# Patient Record
Sex: FEMALE | Race: BLACK OR AFRICAN AMERICAN | NOT HISPANIC OR LATINO | URBAN - METROPOLITAN AREA
[De-identification: names, ages, dates, MRNs, and addresses within clinical notes are randomized per-mention and may not be internally consistent; named-entity substitution may affect disease eponyms.]

---

## 2024-04-15 ENCOUNTER — NURSING HOME VISIT (OUTPATIENT)
Dept: POST ACUTE CARE | Facility: EXTERNAL LOCATION | Age: 89
End: 2024-04-15
Payer: MEDICARE

## 2024-04-15 DIAGNOSIS — I10 HYPERTENSION, UNSPECIFIED TYPE: ICD-10-CM

## 2024-04-15 DIAGNOSIS — I50.9 CONGESTIVE HEART FAILURE, UNSPECIFIED HF CHRONICITY, UNSPECIFIED HEART FAILURE TYPE (MULTI): ICD-10-CM

## 2024-04-15 DIAGNOSIS — A49.9 BACTERIAL INFECTION: ICD-10-CM

## 2024-04-15 DIAGNOSIS — K21.9 GASTROESOPHAGEAL REFLUX DISEASE, UNSPECIFIED WHETHER ESOPHAGITIS PRESENT: ICD-10-CM

## 2024-04-15 DIAGNOSIS — E87.6 HYPOKALEMIA: ICD-10-CM

## 2024-04-15 DIAGNOSIS — J93.9 PNEUMOTHORAX ON RIGHT: Primary | ICD-10-CM

## 2024-04-15 PROBLEM — Z86.711 HISTORY OF PULMONARY EMBOLUS (PE): Status: ACTIVE | Noted: 2024-04-15

## 2024-04-15 PROBLEM — R53.1 WEAKNESS: Status: ACTIVE | Noted: 2024-04-15

## 2024-04-15 PROCEDURE — 99305 1ST NF CARE MODERATE MDM 35: CPT | Performed by: INTERNAL MEDICINE

## 2024-04-15 NOTE — ASSESSMENT & PLAN NOTE
Recurrent  Status post chest tube  Monitor output monitor site  Follow-up with pulmonary outpatient

## 2024-04-15 NOTE — PROGRESS NOTES
HISTORY & PHYSICAL    Subjective   Chief complaint: Suleman Marquis is a 88 y.o. female who is being seen and evaluated for multiple medical problems.  Patient admitted to SNF for therapy due to weakness after recent hospitalization.    HPI:  HPI  Patient has a past medical history that is significant for carcinoma of the breast, recurrent right pneumothorax who had presented to hospital with increasing shortness of breath.  Patient was found to have recurrent right pneumothorax and initially was treated with frequent pigtail catheter insertion and subsequently required right chest tube.  Patient was admitted for further evaluation.  Patient also has a history of congestive heart failure, echo in February 2024 revealed EF of 68%.  Patient is on O2.  PT OT worked with patient with recommendations for SNF for further therapy.  Patient was seen and examined at bedside, appears to be in no acute distress.  Denies chest pain or shortness of breath.  Denies nausea or vomiting.  Afebrile    Past Medical History:   Diagnosis Date    Anemia     Breast cancer (Multi)     CHF (congestive heart failure) (Multi)     Encounter for screening mammogram for malignant neoplasm of breast     Visit for screening mammogram    GERD (gastroesophageal reflux disease)     History of pulmonary embolus (PE)     Hypertension     Pneumothorax on right        Past Surgical History:   Procedure Laterality Date    COLONOSCOPY  03/14/2013    Complete Colonoscopy    MASTECTOMY  03/14/2013    Breast Surgery Mastectomy    OTHER SURGICAL HISTORY  03/14/2013    Sigmoidoscopy (Fiberoptic, Therapeutic )       Family History   Problem Relation Name Age of Onset    No Known Problems Mother      No Known Problems Father         Social History     Socioeconomic History    Marital status:      Spouse name: Not on file    Number of children: Not on file    Years of education: Not on file    Highest education level: Not on file   Occupational History     Not on file   Tobacco Use    Smoking status: Not on file    Smokeless tobacco: Not on file   Substance and Sexual Activity    Alcohol use: Not on file    Drug use: Not on file    Sexual activity: Not on file   Other Topics Concern    Not on file   Social History Narrative    Not on file     Social Determinants of Health     Financial Resource Strain: Low Risk  (7/31/2023)    Received from [facility]    Overall Financial Resource Strain (CARDIA)     Difficulty of Paying Living Expenses: Not hard at all   Food Insecurity: No Food Insecurity (7/31/2023)    Received from [facility]    Hunger Vital Sign     Worried About Running Out of Food in the Last Year: Never true     Ran Out of Food in the Last Year: Never true   Transportation Needs: No Transportation Needs (7/31/2023)    Received from [facility]    PRAPARE - Transportation     Lack of Transportation (Medical): No     Lack of Transportation (Non-Medical): No   Physical Activity: Not on file   Stress: Not on file   Social Connections: Not on file   Intimate Partner Violence: Not on file   Housing Stability: Low Risk  (7/31/2023)    Received from [facility]    Housing Stability Vital Sign     Unable to Pay for Housing in the Last Year: No     Number of Places Lived in the Last Year: 1     Unstable Housing in the Last Year: No       Vital signs: 107/62, 98.2, 84, 18, 99%    Objective   Physical Exam  Constitutional:       General: She is not in acute distress.  Eyes:      Extraocular Movements: Extraocular movements intact.   Cardiovascular:      Rate and Rhythm: Normal rate and regular rhythm.   Pulmonary:      Effort: Pulmonary effort is normal.      Breath sounds: Normal breath sounds.      Comments: O2  Chest:      Comments: Right chest tube  Abdominal:      General: Bowel sounds are normal.      Palpations: Abdomen is soft.   Musculoskeletal:      Cervical back: Neck supple.      Right lower leg: No edema.      Left lower leg: No edema.    Neurological:      Mental Status: She is alert.   Psychiatric:         Mood and Affect: Mood normal.         Behavior: Behavior is cooperative.         Assessment/Plan   Problem List Items Addressed This Visit       Hypertension     Monitor BP  Metoprolol         GERD (gastroesophageal reflux disease)     PPI  Monitor for GI symptoms         CHF (congestive heart failure) (Multi)     Echo revealed EF of 68% February 2024  Lasix  Monitor for shortness of breath         Hypokalemia     Potassium  Monitor labs         Pneumothorax on right - Primary     Recurrent  Status post chest tube  Monitor output monitor site  Follow-up with pulmonary outpatient         Bacterial infection     Doxycycline until complete          Hospital records reviewed  Medications, treatments, and labs reviewed  Continue medications and treatments as listed in EMR  Discussed with nursing and therapy      Scribe Attestation  I, Jeanie Bakeribe   attest that this documentation has been prepared under the direction and in the presence of Jacqueline Singh MD    Provider Attestation - Scribe documentation  All medical record entries made by the Scribe were at my direction and personally dictated by me. I have reviewed the chart and agree that the record accurately reflects my personal performance of the history, physical exam, discussion and plan.   Jacqueline Singh MD

## 2024-04-15 NOTE — LETTER
Patient: Suleman Marquis  : 1935    Encounter Date: 04/15/2024    HISTORY & PHYSICAL    Subjective  Chief complaint: Suleman Marquis is a 88 y.o. female who is being seen and evaluated for multiple medical problems.  Patient admitted to SNF for therapy due to weakness after recent hospitalization.    HPI:  HPI  Patient has a past medical history that is significant for carcinoma of the breast, recurrent right pneumothorax who had presented to hospital with increasing shortness of breath.  Patient was found to have recurrent right pneumothorax and initially was treated with frequent pigtail catheter insertion and subsequently required right chest tube.  Patient was admitted for further evaluation.  Patient also has a history of congestive heart failure, echo in 2024 revealed EF of 68%.  Patient is on O2.  PT OT worked with patient with recommendations for SNF for further therapy.  Patient was seen and examined at bedside, appears to be in no acute distress.  Denies chest pain or shortness of breath.  Denies nausea or vomiting.  Afebrile    Past Medical History:   Diagnosis Date   • Anemia    • Breast cancer (Multi)    • CHF (congestive heart failure) (Multi)    • Encounter for screening mammogram for malignant neoplasm of breast     Visit for screening mammogram   • GERD (gastroesophageal reflux disease)    • History of pulmonary embolus (PE)    • Hypertension    • Pneumothorax on right        Past Surgical History:   Procedure Laterality Date   • COLONOSCOPY  2013    Complete Colonoscopy   • MASTECTOMY  2013    Breast Surgery Mastectomy   • OTHER SURGICAL HISTORY  2013    Sigmoidoscopy (Fiberoptic, Therapeutic )       Family History   Problem Relation Name Age of Onset   • No Known Problems Mother     • No Known Problems Father         Social History     Socioeconomic History   • Marital status:      Spouse name: Not on file   • Number of children: Not on file   • Years of  education: Not on file   • Highest education level: Not on file   Occupational History   • Not on file   Tobacco Use   • Smoking status: Not on file   • Smokeless tobacco: Not on file   Substance and Sexual Activity   • Alcohol use: Not on file   • Drug use: Not on file   • Sexual activity: Not on file   Other Topics Concern   • Not on file   Social History Narrative   • Not on file     Social Determinants of Health     Financial Resource Strain: Low Risk  (7/31/2023)    Received from [facility]    Overall Financial Resource Strain (CARDIA)    • Difficulty of Paying Living Expenses: Not hard at all   Food Insecurity: No Food Insecurity (7/31/2023)    Received from [facility]    Hunger Vital Sign    • Worried About Running Out of Food in the Last Year: Never true    • Ran Out of Food in the Last Year: Never true   Transportation Needs: No Transportation Needs (7/31/2023)    Received from [facility]    PRAPARE - Transportation    • Lack of Transportation (Medical): No    • Lack of Transportation (Non-Medical): No   Physical Activity: Not on file   Stress: Not on file   Social Connections: Not on file   Intimate Partner Violence: Not on file   Housing Stability: Low Risk  (7/31/2023)    Received from [facility]    Housing Stability Vital Sign    • Unable to Pay for Housing in the Last Year: No    • Number of Places Lived in the Last Year: 1    • Unstable Housing in the Last Year: No       Vital signs: 107/62, 98.2, 84, 18, 99%    Objective  Physical Exam  Constitutional:       General: She is not in acute distress.  Eyes:      Extraocular Movements: Extraocular movements intact.   Cardiovascular:      Rate and Rhythm: Normal rate and regular rhythm.   Pulmonary:      Effort: Pulmonary effort is normal.      Breath sounds: Normal breath sounds.      Comments: O2  Chest:      Comments: Right chest tube  Abdominal:      General: Bowel sounds are normal.      Palpations: Abdomen is soft.    Musculoskeletal:      Cervical back: Neck supple.      Right lower leg: No edema.      Left lower leg: No edema.   Neurological:      Mental Status: She is alert.   Psychiatric:         Mood and Affect: Mood normal.         Behavior: Behavior is cooperative.         Assessment/Plan  Problem List Items Addressed This Visit       Hypertension     Monitor BP  Metoprolol         GERD (gastroesophageal reflux disease)     PPI  Monitor for GI symptoms         CHF (congestive heart failure) (Multi)     Echo revealed EF of 68% February 2024  Lasix  Monitor for shortness of breath         Hypokalemia     Potassium  Monitor labs         Pneumothorax on right - Primary     Recurrent  Status post chest tube  Monitor output monitor site  Follow-up with pulmonary outpatient         Bacterial infection     Doxycycline until complete          Hospital records reviewed  Medications, treatments, and labs reviewed  Continue medications and treatments as listed in EMR  Discussed with nursing and therapy      Scribe Attestation  Debbie GOMEZ Scribe   attest that this documentation has been prepared under the direction and in the presence of Jacqueline Singh MD    Provider Attestation - Scribe documentation  All medical record entries made by the Scribe were at my direction and personally dictated by me. I have reviewed the chart and agree that the record accurately reflects my personal performance of the history, physical exam, discussion and plan.   Jacqueline Singh MD      Electronically Signed By: Jacqueline Singh MD   4/15/24  3:39 PM

## 2024-04-18 ENCOUNTER — NURSING HOME VISIT (OUTPATIENT)
Dept: POST ACUTE CARE | Facility: EXTERNAL LOCATION | Age: 89
End: 2024-04-18
Payer: MEDICARE

## 2024-04-18 DIAGNOSIS — I10 HYPERTENSION, UNSPECIFIED TYPE: ICD-10-CM

## 2024-04-18 DIAGNOSIS — R53.1 WEAKNESS: Primary | ICD-10-CM

## 2024-04-18 DIAGNOSIS — J93.9 PNEUMOTHORAX ON RIGHT: ICD-10-CM

## 2024-04-18 DIAGNOSIS — E43 SEVERE PROTEIN-CALORIE MALNUTRITION (MULTI): ICD-10-CM

## 2024-04-18 DIAGNOSIS — R23.9 ALTERATION IN SKIN INTEGRITY: ICD-10-CM

## 2024-04-18 PROCEDURE — 99309 SBSQ NF CARE MODERATE MDM 30: CPT | Performed by: NURSE PRACTITIONER

## 2024-04-18 NOTE — LETTER
Patient: Suleman Marquis  : 1935    Encounter Date: 2024    PROGRESS NOTE    Subjective  Chief complaint: uSleman Marquis is a 88 y.o. female who is an acute skilled patient being seen and evaluated for weakness    HPI: Has been readmitted, had required ED visit to replace chest tube. It was determined in the hospital that the chest tube was not required to be replaced.   She is frail. Pleasant and talkative. He chest wound is covered with clean dressing. Has orders to apply metronidazole gel to the wound bed.   Remains on oral antibiotic. The therapist is scheduled to evaluate her function for treatment plan. .     HPI      Objective  Vital signs: 112/68-77-18-97.4     Physical Exam  Vitals and nursing note reviewed.   Constitutional:       General: She is not in acute distress.     Appearance: She is well-groomed. She is cachectic.   Eyes:      Extraocular Movements: Extraocular movements intact.   Cardiovascular:      Rate and Rhythm: Normal rate and regular rhythm.   Pulmonary:      Effort: Pulmonary effort is normal.      Breath sounds: Normal breath sounds.      Comments: Lungs clear   Chest:      Comments: 1. Has large open wound that involves both breast and sternal region   Wound is pink   No inflammation   No odor   2.  shaye cath  site right upper chest         Abdominal:      General: Bowel sounds are normal.      Palpations: Abdomen is soft.   Musculoskeletal:      Cervical back: Neck supple.      Right lower leg: No edema.      Left lower leg: No edema.   Neurological:      Mental Status: She is alert.   Psychiatric:         Mood and Affect: Mood normal.         Behavior: Behavior is cooperative.         Assessment/Plan  Problem List Items Addressed This Visit       Hypertension     Monitor BP  Metoprolol  Monitor labs          Weakness - Primary     Requires rehabilitation  Monitor tolerance and endurance   Is frail   Requires extensive assistance from staff for all HOC and mobility           Pneumothorax on right     Chest tube had displaced    Sent to hospital to determine if required replacement   The chest tube  was not replaced.   Monitor          Alteration in skin integrity     Has large open wound on chest wall  Wound bed is pink   TX to apply metronidazole gel to wound    Wound care provider to follow in the facility          Malnutrition (Multi)     Malnutrition HX advanced cancer  Monitor intake   Dietitian to evaluate    Supplements   Labs             Medications, treatments, and labs reviewed  Continue medications and treatments as listed in EMR    DAVID Pressley      Electronically Signed By: DAVID Pressley   4/20/24  6:02 PM

## 2024-04-20 PROBLEM — E46 MALNUTRITION (MULTI): Status: ACTIVE | Noted: 2024-04-20

## 2024-04-20 PROBLEM — R23.9 ALTERATION IN SKIN INTEGRITY: Status: ACTIVE | Noted: 2024-04-20

## 2024-04-20 NOTE — ASSESSMENT & PLAN NOTE
Chest tube had displaced    Sent to hospital to determine if required replacement   The chest tube  was not replaced.   Monitor

## 2024-04-20 NOTE — PROGRESS NOTES
PROGRESS NOTE    Subjective   Chief complaint: Suleman Marquis is a 88 y.o. female who is an acute skilled patient being seen and evaluated for weakness    HPI: Has been readmitted, had required ED visit to replace chest tube. It was determined in the hospital that the chest tube was not required to be replaced.   She is frail. Pleasant and talkative. He chest wound is covered with clean dressing. Has orders to apply metronidazole gel to the wound bed.   Remains on oral antibiotic. The therapist is scheduled to evaluate her function for treatment plan. .     HPI      Objective   Vital signs: 112/68-77-18-97.4     Physical Exam  Vitals and nursing note reviewed.   Constitutional:       General: She is not in acute distress.     Appearance: She is well-groomed. She is cachectic.   Eyes:      Extraocular Movements: Extraocular movements intact.   Cardiovascular:      Rate and Rhythm: Normal rate and regular rhythm.   Pulmonary:      Effort: Pulmonary effort is normal.      Breath sounds: Normal breath sounds.      Comments: Lungs clear   Chest:      Comments: 1. Has large open wound that involves both breast and sternal region   Wound is pink   No inflammation   No odor   2.  shaye cath  site right upper chest         Abdominal:      General: Bowel sounds are normal.      Palpations: Abdomen is soft.   Musculoskeletal:      Cervical back: Neck supple.      Right lower leg: No edema.      Left lower leg: No edema.   Neurological:      Mental Status: She is alert.   Psychiatric:         Mood and Affect: Mood normal.         Behavior: Behavior is cooperative.         Assessment/Plan   Problem List Items Addressed This Visit       Hypertension     Monitor BP  Metoprolol  Monitor labs          Weakness - Primary     Requires rehabilitation  Monitor tolerance and endurance   Is frail   Requires extensive assistance from staff for all HOC and mobility          Pneumothorax on right     Chest tube had displaced    Sent to  hospital to determine if required replacement   The chest tube  was not replaced.   Monitor          Alteration in skin integrity     Has large open wound on chest wall  Wound bed is pink   TX to apply metronidazole gel to wound    Wound care provider to follow in the facility          Malnutrition (Multi)     Malnutrition HX advanced cancer  Monitor intake   Dietitian to evaluate    Supplements   Labs             Medications, treatments, and labs reviewed  Continue medications and treatments as listed in EMR    Nikki Pressley, APRN-CNP

## 2024-04-20 NOTE — ASSESSMENT & PLAN NOTE
Requires rehabilitation  Monitor tolerance and endurance   Is frail   Requires extensive assistance from staff for all HOC and mobility

## 2024-04-20 NOTE — ASSESSMENT & PLAN NOTE
Has large open wound on chest wall  Wound bed is pink   TX to apply metronidazole gel to wound    Wound care provider to follow in the facility

## 2024-04-22 ENCOUNTER — NURSING HOME VISIT (OUTPATIENT)
Dept: POST ACUTE CARE | Facility: EXTERNAL LOCATION | Age: 89
End: 2024-04-22
Payer: MEDICARE

## 2024-04-22 DIAGNOSIS — C50.919 MALIGNANT NEOPLASM OF FEMALE BREAST, UNSPECIFIED ESTROGEN RECEPTOR STATUS, UNSPECIFIED LATERALITY, UNSPECIFIED SITE OF BREAST (MULTI): ICD-10-CM

## 2024-04-22 DIAGNOSIS — R23.9 ALTERATION IN SKIN INTEGRITY: ICD-10-CM

## 2024-04-22 DIAGNOSIS — K21.9 GASTROESOPHAGEAL REFLUX DISEASE, UNSPECIFIED WHETHER ESOPHAGITIS PRESENT: ICD-10-CM

## 2024-04-22 DIAGNOSIS — I10 HYPERTENSION, UNSPECIFIED TYPE: ICD-10-CM

## 2024-04-22 DIAGNOSIS — A49.9 BACTERIAL INFECTION: ICD-10-CM

## 2024-04-22 DIAGNOSIS — I50.9 CONGESTIVE HEART FAILURE, UNSPECIFIED HF CHRONICITY, UNSPECIFIED HEART FAILURE TYPE (MULTI): ICD-10-CM

## 2024-04-22 DIAGNOSIS — J93.9 PNEUMOTHORAX ON RIGHT: Primary | ICD-10-CM

## 2024-04-22 PROCEDURE — 99305 1ST NF CARE MODERATE MDM 35: CPT | Performed by: INTERNAL MEDICINE

## 2024-04-22 NOTE — ASSESSMENT & PLAN NOTE
Wound to chest wall  Treatment and interventions in place as per the EMR  Facility wound doctor to follow

## 2024-04-22 NOTE — PROGRESS NOTES
HISTORY & PHYSICAL    Subjective   Chief complaint: Suleman Marquis is a 88 y.o. female who is being seen and evaluated for multiple medical problems.  Patient admitted to SNF for therapy due to weakness after recent hospitalization.    HPI:  HPI  Patient presented to ED due to chest tube being pulled out.  Patient was admitted for further evaluation.  Patient had a recent hospitalization for recurrent right pneumothorax.  Patient had imaging, revealed chest x-ray clean.  Noted that patient did not need replacement of chest tube.  Patient was hemodynamically stable for discharge back to skilled nursing facility for continued medical management and therapy.  Patient does have dressing to chest wound that is clean, dry, intact.  Patient noted to continue antibiotic for bacterial infection, tolerating without adverse effects.  Patient was seen and examined at the bedside, appears to be in no acute distress.  Past Medical History:   Diagnosis Date    Anemia     Breast cancer (Multi)     CHF (congestive heart failure) (Multi)     Encounter for screening mammogram for malignant neoplasm of breast     Visit for screening mammogram    GERD (gastroesophageal reflux disease)     History of pulmonary embolus (PE)     Hypertension     Pneumothorax on right        Past Surgical History:   Procedure Laterality Date    COLONOSCOPY  03/14/2013    Complete Colonoscopy    MASTECTOMY  03/14/2013    Breast Surgery Mastectomy    OTHER SURGICAL HISTORY  03/14/2013    Sigmoidoscopy (Fiberoptic, Therapeutic )       Family History   Problem Relation Name Age of Onset    No Known Problems Mother      No Known Problems Father         Social History     Socioeconomic History    Marital status:      Spouse name: Not on file    Number of children: Not on file    Years of education: Not on file    Highest education level: Not on file   Occupational History    Not on file   Tobacco Use    Smoking status: Not on file    Smokeless tobacco:  Not on file   Substance and Sexual Activity    Alcohol use: Not on file    Drug use: Not on file    Sexual activity: Not on file   Other Topics Concern    Not on file   Social History Narrative    Not on file     Social Determinants of Health     Financial Resource Strain: Low Risk  (7/31/2023)    Received from [facility]    Overall Financial Resource Strain (CARDIA)     Difficulty of Paying Living Expenses: Not hard at all   Food Insecurity: No Food Insecurity (7/31/2023)    Received from [facility]    Hunger Vital Sign     Worried About Running Out of Food in the Last Year: Never true     Ran Out of Food in the Last Year: Never true   Transportation Needs: No Transportation Needs (7/31/2023)    Received from [facility]    PRAPARE - Transportation     Lack of Transportation (Medical): No     Lack of Transportation (Non-Medical): No   Physical Activity: Not on file   Stress: Not on file   Social Connections: Not on file   Intimate Partner Violence: Not on file   Housing Stability: Low Risk  (7/31/2023)    Received from [facility]    Housing Stability Vital Sign     Unable to Pay for Housing in the Last Year: No     Number of Places Lived in the Last Year: 1     Unstable Housing in the Last Year: No       Vital signs: 114/78, 98.1, 78, 17, 98%    Objective   Physical Exam  Constitutional:       General: She is not in acute distress.  Eyes:      Extraocular Movements: Extraocular movements intact.   Cardiovascular:      Rate and Rhythm: Normal rate and regular rhythm.   Pulmonary:      Effort: Pulmonary effort is normal.      Breath sounds: Normal breath sounds.   Abdominal:      General: Bowel sounds are normal.      Palpations: Abdomen is soft.   Musculoskeletal:      Cervical back: Neck supple.      Right lower leg: No edema.      Left lower leg: No edema.   Skin:     Comments: Dressing to chest clean dry and intact   Neurological:      Mental Status: She is alert.   Psychiatric:          Mood and Affect: Mood normal.         Behavior: Behavior is cooperative.         Assessment/Plan   Problem List Items Addressed This Visit       Hypertension     Monitor BP  Metoprolol         GERD (gastroesophageal reflux disease)     PPI  Monitor for GI symptoms         CHF (congestive heart failure) (Multi)     Echo revealed EF of 68% February 2024  Lasix  Monitor for shortness of breath         Pneumothorax on right - Primary     Chest tube pulled out, sent to ED, no further need for chest tube         Bacterial infection     Doxycycline until complete         Alteration in skin integrity     Wound to chest wall  Treatment and interventions in place as per the EMR  Facility wound doctor to follow         Breast cancer (Multi)     Follows outpatient  Anastrozole          Hospital records reviewed  Medications, treatments, and labs reviewed  Continue medications and treatments as listed in EMR  Discussed with nursing and therapy      Scribe Attestation  I, Charity Baker   attest that this documentation has been prepared under the direction and in the presence of Jacqueline Singh MD    Provider Attestation - Scribe documentation  All medical record entries made by the Scribe were at my direction and personally dictated by me. I have reviewed the chart and agree that the record accurately reflects my personal performance of the history, physical exam, discussion and plan.   Jacqueline Singh MD

## 2024-04-22 NOTE — LETTER
Patient: Suleman Marquis  : 1935    Encounter Date: 2024    HISTORY & PHYSICAL    Subjective  Chief complaint: Suleman Marquis is a 88 y.o. female who is being seen and evaluated for multiple medical problems.  Patient admitted to SNF for therapy due to weakness after recent hospitalization.    HPI:  HPI  Patient presented to ED due to chest tube being pulled out.  Patient was admitted for further evaluation.  Patient had a recent hospitalization for recurrent right pneumothorax.  Patient had imaging, revealed chest x-ray clean.  Noted that patient did not need replacement of chest tube.  Patient was hemodynamically stable for discharge back to skilled nursing facility for continued medical management and therapy.  Patient does have dressing to chest wound that is clean, dry, intact.  Patient noted to continue antibiotic for bacterial infection, tolerating without adverse effects.  Patient was seen and examined at the bedside, appears to be in no acute distress.  Past Medical History:   Diagnosis Date   • Anemia    • Breast cancer (Multi)    • CHF (congestive heart failure) (Multi)    • Encounter for screening mammogram for malignant neoplasm of breast     Visit for screening mammogram   • GERD (gastroesophageal reflux disease)    • History of pulmonary embolus (PE)    • Hypertension    • Pneumothorax on right        Past Surgical History:   Procedure Laterality Date   • COLONOSCOPY  2013    Complete Colonoscopy   • MASTECTOMY  2013    Breast Surgery Mastectomy   • OTHER SURGICAL HISTORY  2013    Sigmoidoscopy (Fiberoptic, Therapeutic )       Family History   Problem Relation Name Age of Onset   • No Known Problems Mother     • No Known Problems Father         Social History     Socioeconomic History   • Marital status:      Spouse name: Not on file   • Number of children: Not on file   • Years of education: Not on file   • Highest education level: Not on file   Occupational  History   • Not on file   Tobacco Use   • Smoking status: Not on file   • Smokeless tobacco: Not on file   Substance and Sexual Activity   • Alcohol use: Not on file   • Drug use: Not on file   • Sexual activity: Not on file   Other Topics Concern   • Not on file   Social History Narrative   • Not on file     Social Determinants of Health     Financial Resource Strain: Low Risk  (7/31/2023)    Received from [facility]    Overall Financial Resource Strain (CARDIA)    • Difficulty of Paying Living Expenses: Not hard at all   Food Insecurity: No Food Insecurity (7/31/2023)    Received from [facility]    Hunger Vital Sign    • Worried About Running Out of Food in the Last Year: Never true    • Ran Out of Food in the Last Year: Never true   Transportation Needs: No Transportation Needs (7/31/2023)    Received from [facility]    PRAPARE - Transportation    • Lack of Transportation (Medical): No    • Lack of Transportation (Non-Medical): No   Physical Activity: Not on file   Stress: Not on file   Social Connections: Not on file   Intimate Partner Violence: Not on file   Housing Stability: Low Risk  (7/31/2023)    Received from [facility]    Housing Stability Vital Sign    • Unable to Pay for Housing in the Last Year: No    • Number of Places Lived in the Last Year: 1    • Unstable Housing in the Last Year: No       Vital signs: 114/78, 98.1, 78, 17, 98%    Objective  Physical Exam  Constitutional:       General: She is not in acute distress.  Eyes:      Extraocular Movements: Extraocular movements intact.   Cardiovascular:      Rate and Rhythm: Normal rate and regular rhythm.   Pulmonary:      Effort: Pulmonary effort is normal.      Breath sounds: Normal breath sounds.   Abdominal:      General: Bowel sounds are normal.      Palpations: Abdomen is soft.   Musculoskeletal:      Cervical back: Neck supple.      Right lower leg: No edema.      Left lower leg: No edema.   Skin:     Comments: Dressing  to chest clean dry and intact   Neurological:      Mental Status: She is alert.   Psychiatric:         Mood and Affect: Mood normal.         Behavior: Behavior is cooperative.         Assessment/Plan  Problem List Items Addressed This Visit       Hypertension     Monitor BP  Metoprolol         GERD (gastroesophageal reflux disease)     PPI  Monitor for GI symptoms         CHF (congestive heart failure) (Multi)     Echo revealed EF of 68% February 2024  Lasix  Monitor for shortness of breath         Pneumothorax on right - Primary     Chest tube pulled out, sent to ED, no further need for chest tube         Bacterial infection     Doxycycline until complete         Alteration in skin integrity     Wound to chest wall  Treatment and interventions in place as per the EMR  Facility wound doctor to follow         Breast cancer (Multi)     Follows outpatient  Anastrozole          Hospital records reviewed  Medications, treatments, and labs reviewed  Continue medications and treatments as listed in EMR  Discussed with nursing and therapy      Scribe Attestation  I, Charity Baker   attest that this documentation has been prepared under the direction and in the presence of Jacqueline Singh MD    Provider Attestation - Scribe documentation  All medical record entries made by the Scribe were at my direction and personally dictated by me. I have reviewed the chart and agree that the record accurately reflects my personal performance of the history, physical exam, discussion and plan.   Jacqueline Singh MD      Electronically Signed By: Jacqueline Singh MD   4/22/24  1:09 PM

## 2024-04-23 ENCOUNTER — NURSING HOME VISIT (OUTPATIENT)
Dept: POST ACUTE CARE | Facility: EXTERNAL LOCATION | Age: 89
End: 2024-04-23
Payer: MEDICARE

## 2024-04-23 DIAGNOSIS — R53.1 WEAKNESS: ICD-10-CM

## 2024-04-23 DIAGNOSIS — K21.9 GASTROESOPHAGEAL REFLUX DISEASE, UNSPECIFIED WHETHER ESOPHAGITIS PRESENT: Primary | ICD-10-CM

## 2024-04-23 DIAGNOSIS — I50.9 CONGESTIVE HEART FAILURE, UNSPECIFIED HF CHRONICITY, UNSPECIFIED HEART FAILURE TYPE (MULTI): ICD-10-CM

## 2024-04-23 DIAGNOSIS — R23.9 ALTERATION IN SKIN INTEGRITY: ICD-10-CM

## 2024-04-23 PROCEDURE — 99309 SBSQ NF CARE MODERATE MDM 30: CPT | Performed by: NURSE PRACTITIONER

## 2024-04-23 NOTE — LETTER
Patient: Suleman Marquis  : 1935    Encounter Date: 2024    PROGRESS NOTE    Subjective  Chief complaint: Suleman Marquis is a 88 y.o. female who is an acute skilled patient being seen and evaluated for weakness    HPI: Remains  in therapy, the therapist  reports that she is motivated. Denies any chest pain or tightness. Nursing reports that her appetite is poor,   HPI      Objective  Vital signs: 121/56-72- 18-98.2     Physical Exam  Vitals and nursing note reviewed.   Constitutional:       General: She is not in acute distress.     Appearance: She is well-groomed and underweight.   Eyes:      Extraocular Movements: Extraocular movements intact.   Cardiovascular:      Rate and Rhythm: Normal rate and regular rhythm.   Pulmonary:      Effort: Pulmonary effort is normal.      Breath sounds: Normal breath sounds.      Comments: Lungs clear   Abdominal:      General: Bowel sounds are normal.      Palpations: Abdomen is soft.   Musculoskeletal:      Cervical back: Neck supple.      Right lower leg: No edema.      Left lower leg: No edema.   Neurological:      Mental Status: She is alert.   Psychiatric:         Mood and Affect: Mood normal.         Behavior: Behavior is cooperative.         Assessment/Plan  Problem List Items Addressed This Visit       GERD (gastroesophageal reflux disease) - Primary     PPI  Monitor for GI symptoms         CHF (congestive heart failure) (Multi)     Echo revealed EF of 68% 2024  Lasix  Monitor for shortness of breath         Weakness     Continue working in therapy towards goals         Alteration in skin integrity     Chest wound  Wound bed is pink   TX to apply metronidazole gel to wound    Wound care provider to follow in the facility           Medications, treatments, and labs reviewed  Continue medications and treatments as listed in EMR    DAVID Pressley      Electronically Signed By: DAVID Pressley   24  8:31 PM

## 2024-04-24 ENCOUNTER — NURSING HOME VISIT (OUTPATIENT)
Dept: POST ACUTE CARE | Facility: EXTERNAL LOCATION | Age: 89
End: 2024-04-24
Payer: MEDICARE

## 2024-04-24 DIAGNOSIS — R53.1 WEAKNESS: Primary | ICD-10-CM

## 2024-04-24 DIAGNOSIS — I10 HYPERTENSION, UNSPECIFIED TYPE: ICD-10-CM

## 2024-04-24 DIAGNOSIS — K21.9 GASTROESOPHAGEAL REFLUX DISEASE, UNSPECIFIED WHETHER ESOPHAGITIS PRESENT: ICD-10-CM

## 2024-04-24 DIAGNOSIS — A49.9 BACTERIAL INFECTION: ICD-10-CM

## 2024-04-24 DIAGNOSIS — I50.9 CONGESTIVE HEART FAILURE, UNSPECIFIED HF CHRONICITY, UNSPECIFIED HEART FAILURE TYPE (MULTI): ICD-10-CM

## 2024-04-24 PROCEDURE — 99309 SBSQ NF CARE MODERATE MDM 30: CPT | Performed by: INTERNAL MEDICINE

## 2024-04-24 NOTE — PROGRESS NOTES
PROGRESS NOTE    Subjective   Chief complaint: Suleman Marquis is a 88 y.o. female who is an acute skilled patient being seen and evaluated for weakness    HPI:  HPI  Patient presents for general medical care and f/u.  Patient seen and examined at bedside.  No issues per nursing.  Patient has no acute complaints.  Patient does continue working in therapy due to generalized weakness.  HTN BP at goal.  Denies chest pain and headache.  GERD controlled.  Denies heartburn, regurgitation, epigastric discomfort, sour taste, and cough.  CHF stable, denies sob, orthopnea, weight gain.  Patient does continue on antibiotic for bacterial infection, tolerating without adverse effects.  No acute distress.    Objective   Vital signs: 119/74, 98.0, 74, 18, 97%    Physical Exam  Constitutional:       General: She is not in acute distress.  Eyes:      Extraocular Movements: Extraocular movements intact.   Cardiovascular:      Rate and Rhythm: Normal rate and regular rhythm.   Pulmonary:      Effort: Pulmonary effort is normal.      Breath sounds: Normal breath sounds.   Abdominal:      General: Bowel sounds are normal.      Palpations: Abdomen is soft.   Musculoskeletal:      Cervical back: Neck supple.      Right lower leg: No edema.      Left lower leg: No edema.   Skin:     Comments: Dressing to chest clean dry and intact   Neurological:      Mental Status: She is alert.      Motor: Weakness present.   Psychiatric:         Mood and Affect: Mood normal.         Behavior: Behavior is cooperative.         Assessment/Plan   Problem List Items Addressed This Visit       Hypertension     Monitor BP  Metoprolol         GERD (gastroesophageal reflux disease)     PPI  Monitor for GI symptoms         CHF (congestive heart failure) (Multi)     Echo revealed EF of 68% February 2024  Lasix  Monitor for shortness of breath         Weakness - Primary     Continue working in therapy towards goals         Bacterial infection     Doxycycline until  complete          Medications, treatments, and labs reviewed  Continue medications and treatments as listed in EMR      Scribe Attestation  I, Charity Baker   attest that this documentation has been prepared under the direction and in the presence of Jacqueline Singh MD    Provider Attestation - Scribe documentation  All medical record entries made by the Scribe were at my direction and personally dictated by me. I have reviewed the chart and agree that the record accurately reflects my personal performance of the history, physical exam, discussion and plan.   Jacqueline Singh MD

## 2024-04-24 NOTE — LETTER
Patient: Suleman Marquis  : 1935    Encounter Date: 2024    PROGRESS NOTE    Subjective  Chief complaint: Suleman Marquis is a 88 y.o. female who is an acute skilled patient being seen and evaluated for weakness    HPI:  HPI  Patient presents for general medical care and f/u.  Patient seen and examined at bedside.  No issues per nursing.  Patient has no acute complaints.  Patient does continue working in therapy due to generalized weakness.  HTN BP at goal.  Denies chest pain and headache.  GERD controlled.  Denies heartburn, regurgitation, epigastric discomfort, sour taste, and cough.  CHF stable, denies sob, orthopnea, weight gain.  Patient does continue on antibiotic for bacterial infection, tolerating without adverse effects.  No acute distress.    Objective  Vital signs: 119/74, 98.0, 74, 18, 97%    Physical Exam  Constitutional:       General: She is not in acute distress.  Eyes:      Extraocular Movements: Extraocular movements intact.   Cardiovascular:      Rate and Rhythm: Normal rate and regular rhythm.   Pulmonary:      Effort: Pulmonary effort is normal.      Breath sounds: Normal breath sounds.   Abdominal:      General: Bowel sounds are normal.      Palpations: Abdomen is soft.   Musculoskeletal:      Cervical back: Neck supple.      Right lower leg: No edema.      Left lower leg: No edema.   Skin:     Comments: Dressing to chest clean dry and intact   Neurological:      Mental Status: She is alert.      Motor: Weakness present.   Psychiatric:         Mood and Affect: Mood normal.         Behavior: Behavior is cooperative.         Assessment/Plan  Problem List Items Addressed This Visit       Hypertension     Monitor BP  Metoprolol         GERD (gastroesophageal reflux disease)     PPI  Monitor for GI symptoms         CHF (congestive heart failure) (Multi)     Echo revealed EF of 68% 2024  Lasix  Monitor for shortness of breath         Weakness - Primary     Continue working in  therapy towards goals         Bacterial infection     Doxycycline until complete          Medications, treatments, and labs reviewed  Continue medications and treatments as listed in EMR      Scribe Attestation  I, Charity Baker   attest that this documentation has been prepared under the direction and in the presence of Jacqueline Singh MD    Provider Attestation - Scribe documentation  All medical record entries made by the Scribe were at my direction and personally dictated by me. I have reviewed the chart and agree that the record accurately reflects my personal performance of the history, physical exam, discussion and plan.   Jacqueline Singh MD        Electronically Signed By: Jacqueline Singh MD   4/24/24  4:07 PM

## 2024-04-25 ENCOUNTER — NURSING HOME VISIT (OUTPATIENT)
Dept: POST ACUTE CARE | Facility: EXTERNAL LOCATION | Age: 89
End: 2024-04-25
Payer: MEDICARE

## 2024-04-25 DIAGNOSIS — I10 HYPERTENSION, UNSPECIFIED TYPE: ICD-10-CM

## 2024-04-25 DIAGNOSIS — R53.1 WEAKNESS: Primary | ICD-10-CM

## 2024-04-25 DIAGNOSIS — E44.0 MODERATE PROTEIN-CALORIE MALNUTRITION (MULTI): ICD-10-CM

## 2024-04-25 DIAGNOSIS — K21.9 GASTROESOPHAGEAL REFLUX DISEASE, UNSPECIFIED WHETHER ESOPHAGITIS PRESENT: ICD-10-CM

## 2024-04-25 PROCEDURE — 99309 SBSQ NF CARE MODERATE MDM 30: CPT | Performed by: NURSE PRACTITIONER

## 2024-04-25 NOTE — LETTER
Patient: Suleman Marquis  : 1935    Encounter Date: 2024    PROGRESS NOTE    Subjective  Chief complaint: Suleman Marquis is a 88 y.o. female who is an acute skilled patient being seen and evaluated for weakness    HPI: She remains in bed. Nursing reports that she is taking supplements. OT reports that he is motivated.   She is actively responding. Denies any pain   HPI      Objective  Vital signs: 112/65-72-18-97.3     Physical Exam  Vitals and nursing note reviewed.   Constitutional:       General: She is not in acute distress.  Eyes:      Extraocular Movements: Extraocular movements intact.   Cardiovascular:      Rate and Rhythm: Normal rate and regular rhythm.   Pulmonary:      Effort: Pulmonary effort is normal.      Breath sounds: Normal breath sounds.   Abdominal:      General: Bowel sounds are normal.      Palpations: Abdomen is soft.   Musculoskeletal:      Cervical back: Neck supple.      Right lower leg: No edema.      Left lower leg: No edema.   Neurological:      Mental Status: She is alert.   Psychiatric:         Mood and Affect: Mood normal.         Behavior: Behavior is cooperative.         Assessment/Plan  Problem List Items Addressed This Visit       Hypertension     Monitor BP  Metoprolol  Monitor labs          GERD (gastroesophageal reflux disease)     PPI  Monitor for GI symptoms         Weakness - Primary     Continue working in therapy towards goals         Malnutrition (Multi)     Malnutrition HX advanced cancer  Monitor intake   Dietitian to evaluate  Taking Supplements   Labs             Medications, treatments, and labs reviewed  Continue medications and treatments as listed in EMR    DAVID Pressley      Electronically Signed By: DAVID Pressley   24  8:38 PM

## 2024-04-26 NOTE — PROGRESS NOTES
PROGRESS NOTE    Subjective   Chief complaint: Suleman Marquis is a 88 y.o. female who is an acute skilled patient being seen and evaluated for weakness    HPI: She remains in bed. Nursing reports that she is taking supplements. OT reports that he is motivated.   She is actively responding. Denies any pain   HPI      Objective   Vital signs: 112/65-72-18-97.3     Physical Exam  Vitals and nursing note reviewed.   Constitutional:       General: She is not in acute distress.  Eyes:      Extraocular Movements: Extraocular movements intact.   Cardiovascular:      Rate and Rhythm: Normal rate and regular rhythm.   Pulmonary:      Effort: Pulmonary effort is normal.      Breath sounds: Normal breath sounds.   Abdominal:      General: Bowel sounds are normal.      Palpations: Abdomen is soft.   Musculoskeletal:      Cervical back: Neck supple.      Right lower leg: No edema.      Left lower leg: No edema.   Neurological:      Mental Status: She is alert.   Psychiatric:         Mood and Affect: Mood normal.         Behavior: Behavior is cooperative.         Assessment/Plan   Problem List Items Addressed This Visit       Hypertension     Monitor BP  Metoprolol  Monitor labs          GERD (gastroesophageal reflux disease)     PPI  Monitor for GI symptoms         Weakness - Primary     Continue working in therapy towards goals         Malnutrition (Multi)     Malnutrition HX advanced cancer  Monitor intake   Dietitian to evaluate  Taking Supplements   Labs             Medications, treatments, and labs reviewed  Continue medications and treatments as listed in EMR    Nikki Pressley, APRN-CNP

## 2024-04-26 NOTE — ASSESSMENT & PLAN NOTE
Malnutrition HX advanced cancer  Monitor intake   Dietitian to evaluate  Taking Supplements   Labs

## 2024-04-26 NOTE — PROGRESS NOTES
PROGRESS NOTE    Subjective   Chief complaint: Suleman Marquis is a 88 y.o. female who is an acute skilled patient being seen and evaluated for weakness    HPI: Remains  in therapy, the therapist  reports that she is motivated. Denies any chest pain or tightness. Nursing reports that her appetite is poor,   HPI      Objective   Vital signs: 121/56-72- 18-98.2     Physical Exam  Vitals and nursing note reviewed.   Constitutional:       General: She is not in acute distress.     Appearance: She is well-groomed and underweight.   Eyes:      Extraocular Movements: Extraocular movements intact.   Cardiovascular:      Rate and Rhythm: Normal rate and regular rhythm.   Pulmonary:      Effort: Pulmonary effort is normal.      Breath sounds: Normal breath sounds.      Comments: Lungs clear   Abdominal:      General: Bowel sounds are normal.      Palpations: Abdomen is soft.   Musculoskeletal:      Cervical back: Neck supple.      Right lower leg: No edema.      Left lower leg: No edema.   Neurological:      Mental Status: She is alert.   Psychiatric:         Mood and Affect: Mood normal.         Behavior: Behavior is cooperative.         Assessment/Plan   Problem List Items Addressed This Visit       GERD (gastroesophageal reflux disease) - Primary     PPI  Monitor for GI symptoms         CHF (congestive heart failure) (Multi)     Echo revealed EF of 68% February 2024  Lasix  Monitor for shortness of breath         Weakness     Continue working in therapy towards goals         Alteration in skin integrity     Chest wound  Wound bed is pink   TX to apply metronidazole gel to wound    Wound care provider to follow in the facility           Medications, treatments, and labs reviewed  Continue medications and treatments as listed in EMR    Nikki Pressley, APRN-CNP

## 2024-04-26 NOTE — ASSESSMENT & PLAN NOTE
Chest wound  Wound bed is pink   TX to apply metronidazole gel to wound    Wound care provider to follow in the facility

## 2024-04-29 ENCOUNTER — NURSING HOME VISIT (OUTPATIENT)
Dept: POST ACUTE CARE | Facility: EXTERNAL LOCATION | Age: 89
End: 2024-04-29
Payer: MEDICARE

## 2024-04-29 DIAGNOSIS — I10 HYPERTENSION, UNSPECIFIED TYPE: ICD-10-CM

## 2024-04-29 DIAGNOSIS — K21.9 GASTROESOPHAGEAL REFLUX DISEASE, UNSPECIFIED WHETHER ESOPHAGITIS PRESENT: ICD-10-CM

## 2024-04-29 DIAGNOSIS — R53.1 WEAKNESS: ICD-10-CM

## 2024-04-29 DIAGNOSIS — I50.9 CONGESTIVE HEART FAILURE, UNSPECIFIED HF CHRONICITY, UNSPECIFIED HEART FAILURE TYPE (MULTI): ICD-10-CM

## 2024-04-29 PROCEDURE — 99308 SBSQ NF CARE LOW MDM 20: CPT | Performed by: INTERNAL MEDICINE

## 2024-04-29 NOTE — PROGRESS NOTES
PROGRESS NOTE    Subjective   Chief complaint: Suleman Marquis is a 88 y.o. female who is an acute skilled patient being seen and evaluated for weakness    HPI:  Therapy has been working with the patient to improve strength and endurance with ADLs, transfers, and mobility.  Patient continues to work toward goals.  Patient is stable and has no new complaints.  Nursing staff voices no new concerns today.      Objective   Vital signs: 117/73,98,97%    Physical Exam  Constitutional:       General: She is not in acute distress.  Eyes:      Extraocular Movements: Extraocular movements intact.   Cardiovascular:      Rate and Rhythm: Normal rate and regular rhythm.   Pulmonary:      Effort: Pulmonary effort is normal.      Breath sounds: Normal breath sounds.   Abdominal:      General: Bowel sounds are normal.      Palpations: Abdomen is soft.   Musculoskeletal:      Cervical back: Neck supple.      Right lower leg: No edema.      Left lower leg: No edema.   Skin:     Comments: Dressing to chest clean dry and intact   Neurological:      Mental Status: She is alert.      Motor: Weakness present.   Psychiatric:         Mood and Affect: Mood normal.         Behavior: Behavior is cooperative.         Assessment/Plan   Problem List Items Addressed This Visit       Hypertension     Monitor BP  Metoprolol  Monitor labs          GERD (gastroesophageal reflux disease)     PPI  Monitor for GI symptoms         CHF (congestive heart failure) (Multi)     Echo revealed EF of 68% February 2024  Lasix  Monitor for shortness of breath         Weakness     Continue working in therapy towards goals          Medications, treatments, and labs reviewed  Continue medications and treatments as listed in EMR    Scribe Attestation  ISnehal Scribe   attest that this documentation has been prepared under the direction and in the presence of Jacqueline Singh MD.     Provider Attestation - Scribe documentation  All medical record entries  made by the Scribe were at my direction and personally dictated by me. I have reviewed the chart and agree that the record accurately reflects my personal performance of the history, physical exam, discussion and plan.   Jacqueline Singh MD

## 2024-04-29 NOTE — LETTER
Patient: Suleman Marquis  : 1935    Encounter Date: 2024    PROGRESS NOTE    Subjective  Chief complaint: Suleman Marquis is a 88 y.o. female who is an acute skilled patient being seen and evaluated for weakness    HPI:  Therapy has been working with the patient to improve strength and endurance with ADLs, transfers, and mobility.  Patient continues to work toward goals.  Patient is stable and has no new complaints.  Nursing staff voices no new concerns today.      Objective  Vital signs: 117/73,98,97%    Physical Exam  Constitutional:       General: She is not in acute distress.  Eyes:      Extraocular Movements: Extraocular movements intact.   Cardiovascular:      Rate and Rhythm: Normal rate and regular rhythm.   Pulmonary:      Effort: Pulmonary effort is normal.      Breath sounds: Normal breath sounds.   Abdominal:      General: Bowel sounds are normal.      Palpations: Abdomen is soft.   Musculoskeletal:      Cervical back: Neck supple.      Right lower leg: No edema.      Left lower leg: No edema.   Skin:     Comments: Dressing to chest clean dry and intact   Neurological:      Mental Status: She is alert.      Motor: Weakness present.   Psychiatric:         Mood and Affect: Mood normal.         Behavior: Behavior is cooperative.         Assessment/Plan  Problem List Items Addressed This Visit       Hypertension     Monitor BP  Metoprolol  Monitor labs          GERD (gastroesophageal reflux disease)     PPI  Monitor for GI symptoms         CHF (congestive heart failure) (Multi)     Echo revealed EF of 68% 2024  Lasix  Monitor for shortness of breath         Weakness     Continue working in therapy towards goals          Medications, treatments, and labs reviewed  Continue medications and treatments as listed in EMR    Scribe Attestation  I, Snehal Mcguire, Charity   attest that this documentation has been prepared under the direction and in the presence of Jacqueline Singh MD.      Provider Attestation - Scribe documentation  All medical record entries made by the Scribe were at my direction and personally dictated by me. I have reviewed the chart and agree that the record accurately reflects my personal performance of the history, physical exam, discussion and plan.   Jacqueline Singh MD      Electronically Signed By: Jacqueline Singh MD   4/29/24  5:07 PM

## 2024-04-30 ENCOUNTER — NURSING HOME VISIT (OUTPATIENT)
Dept: POST ACUTE CARE | Facility: EXTERNAL LOCATION | Age: 89
End: 2024-04-30
Payer: MEDICARE

## 2024-04-30 DIAGNOSIS — R53.1 WEAKNESS: Primary | ICD-10-CM

## 2024-04-30 DIAGNOSIS — I15.8 OTHER SECONDARY HYPERTENSION: ICD-10-CM

## 2024-04-30 DIAGNOSIS — E44.0 MODERATE PROTEIN-CALORIE MALNUTRITION (MULTI): ICD-10-CM

## 2024-04-30 DIAGNOSIS — K21.9 GASTROESOPHAGEAL REFLUX DISEASE, UNSPECIFIED WHETHER ESOPHAGITIS PRESENT: ICD-10-CM

## 2024-04-30 DIAGNOSIS — Z78.9 ADVANCE DIRECTIVE IN CHART: ICD-10-CM

## 2024-04-30 PROCEDURE — 99309 SBSQ NF CARE MODERATE MDM 30: CPT | Performed by: NURSE PRACTITIONER

## 2024-04-30 NOTE — LETTER
Patient: Suleman Marquis  : 1935    Encounter Date: 2024    PROGRESS NOTE    Subjective  Chief complaint: Suleman Marquis is a 88 y.o. female who is an acute skilled patient being seen and evaluated for weakness    HPI: .  Remains in therapy.  The physical therapist reports that she is making progress.  She is alert and talkative.  Denies any chest pain or tightness is taking supplements.   Family meeting was held this morning with the  and director of nursing including both the patient and her son.  Her advanced directives were changed to a DO NOT RESUSCITATE and she requested that the anastrozole be discontinued.   HPI      Objective  Vital signs: 112/65-72-18-97.4     Physical Exam  Vitals and nursing note reviewed.   Constitutional:       General: She is not in acute distress.     Appearance: She is underweight.   Eyes:      Extraocular Movements: Extraocular movements intact.   Cardiovascular:      Rate and Rhythm: Normal rate and regular rhythm.   Pulmonary:      Effort: Pulmonary effort is normal.      Breath sounds: Normal breath sounds.      Comments: Lungs clear   Abdominal:      General: Bowel sounds are normal.      Palpations: Abdomen is soft.   Musculoskeletal:      Cervical back: Neck supple.      Right lower leg: No edema.      Left lower leg: No edema.   Neurological:      Mental Status: She is alert.   Psychiatric:         Mood and Affect: Mood normal.         Behavior: Behavior is cooperative.         Assessment/Plan  Problem List Items Addressed This Visit       Hypertension     Monitor BP  Metoprolol  Monitor labs          GERD (gastroesophageal reflux disease)     PPI  Monitor for GI symptoms         Weakness - Primary     Continue working in therapy towards goals  Therapist reports that she is making slow progress          Malnutrition (Multi)     Malnutrition HX advanced cancer  Monitor intake   Dietitian to evaluate  Taking Supplements   Labs            Advance  directive in chart     Family meeting was held this morning the patient, her son (by phone) ROOSEVELT and GERA   Advanced directives discussed and changed to - DNRcc  The patient requested  anastrozole be discontinued. Her son is in full agreement           Medications, treatments, and labs reviewed  Continue medications and treatments as listed in EMR    DAVID Pressley      Electronically Signed By: DAVID Pressley   5/4/24  3:34 PM

## 2024-05-01 ENCOUNTER — NURSING HOME VISIT (OUTPATIENT)
Dept: POST ACUTE CARE | Facility: EXTERNAL LOCATION | Age: 89
End: 2024-05-01
Payer: MEDICARE

## 2024-05-01 DIAGNOSIS — R53.1 WEAKNESS: Primary | ICD-10-CM

## 2024-05-01 DIAGNOSIS — I50.9 CONGESTIVE HEART FAILURE, UNSPECIFIED HF CHRONICITY, UNSPECIFIED HEART FAILURE TYPE (MULTI): ICD-10-CM

## 2024-05-01 DIAGNOSIS — I10 HYPERTENSION, UNSPECIFIED TYPE: ICD-10-CM

## 2024-05-01 DIAGNOSIS — A49.9 BACTERIAL INFECTION: ICD-10-CM

## 2024-05-01 PROCEDURE — 99308 SBSQ NF CARE LOW MDM 20: CPT | Performed by: INTERNAL MEDICINE

## 2024-05-01 NOTE — LETTER
Patient: Suleman Marquis  : 1935    Encounter Date: 2024    PROGRESS NOTE    Subjective  Chief complaint: Suleman Marquis is a 88 y.o. female who is an acute skilled patient being seen and evaluated for weakness    HPI:  HPI  Patient does continue working in therapy due to generalized weakness.  Therapy is working with patient to address therapeutic exercise and activities, neuromuscular education, ADLs and self-care.  Patient does continue on antibiotic for bacterial infection, tolerating without adverse effects.  Patient seen and examined at the bedside, appears to be in no acute distress.  Denies fever or chills.  Denies nausea or vomiting.    Objective  Vital signs: 115/71, 98.1, 70, 17, 96%    Physical Exam  Constitutional:       General: She is not in acute distress.  Eyes:      Extraocular Movements: Extraocular movements intact.   Cardiovascular:      Rate and Rhythm: Normal rate and regular rhythm.   Pulmonary:      Effort: Pulmonary effort is normal.      Breath sounds: Normal breath sounds.   Abdominal:      General: Bowel sounds are normal.      Palpations: Abdomen is soft.   Musculoskeletal:      Cervical back: Neck supple.      Right lower leg: No edema.      Left lower leg: No edema.   Skin:     Comments: Dressing to chest clean dry and intact   Neurological:      Mental Status: She is alert.      Motor: Weakness present.   Psychiatric:         Mood and Affect: Mood normal.         Behavior: Behavior is cooperative.         Assessment/Plan  Problem List Items Addressed This Visit       Hypertension     Monitor BP  Metoprolol  BP at goal         CHF (congestive heart failure) (Multi)     Echo revealed EF of 68% 2024  Lasix  Monitor for shortness of breath         Weakness - Primary     Continue progressing in therapy         Bacterial infection     Doxycycline until complete          Medications, treatments, and labs reviewed  Continue medications and treatments as listed in  EMR      Scribe Attestation  I, Charity Baker   attest that this documentation has been prepared under the direction and in the presence of Jacqueline Singh MD    Provider Attestation - Scribe documentation  All medical record entries made by the Scribe were at my direction and personally dictated by me. I have reviewed the chart and agree that the record accurately reflects my personal performance of the history, physical exam, discussion and plan.   Jacqueline Singh MD        Electronically Signed By: Jacqueline Singh MD   5/1/24  2:05 PM

## 2024-05-01 NOTE — PROGRESS NOTES
PROGRESS NOTE    Subjective   Chief complaint: Suleman Marquis is a 88 y.o. female who is an acute skilled patient being seen and evaluated for weakness    HPI:  HPI  Patient does continue working in therapy due to generalized weakness.  Therapy is working with patient to address therapeutic exercise and activities, neuromuscular education, ADLs and self-care.  Patient does continue on antibiotic for bacterial infection, tolerating without adverse effects.  Patient seen and examined at the bedside, appears to be in no acute distress.  Denies fever or chills.  Denies nausea or vomiting.    Objective   Vital signs: 115/71, 98.1, 70, 17, 96%    Physical Exam  Constitutional:       General: She is not in acute distress.  Eyes:      Extraocular Movements: Extraocular movements intact.   Cardiovascular:      Rate and Rhythm: Normal rate and regular rhythm.   Pulmonary:      Effort: Pulmonary effort is normal.      Breath sounds: Normal breath sounds.   Abdominal:      General: Bowel sounds are normal.      Palpations: Abdomen is soft.   Musculoskeletal:      Cervical back: Neck supple.      Right lower leg: No edema.      Left lower leg: No edema.   Skin:     Comments: Dressing to chest clean dry and intact   Neurological:      Mental Status: She is alert.      Motor: Weakness present.   Psychiatric:         Mood and Affect: Mood normal.         Behavior: Behavior is cooperative.         Assessment/Plan   Problem List Items Addressed This Visit       Hypertension     Monitor BP  Metoprolol  BP at goal         CHF (congestive heart failure) (Multi)     Echo revealed EF of 68% February 2024  Lasix  Monitor for shortness of breath         Weakness - Primary     Continue progressing in therapy         Bacterial infection     Doxycycline until complete          Medications, treatments, and labs reviewed  Continue medications and treatments as listed in EMR      Scribe Attestation  I, Charity Baker   attest that this  documentation has been prepared under the direction and in the presence of Jacqueline Singh MD    Provider Attestation - Scribe documentation  All medical record entries made by the Scribe were at my direction and personally dictated by me. I have reviewed the chart and agree that the record accurately reflects my personal performance of the history, physical exam, discussion and plan.   Jacqueline Singh MD

## 2024-05-02 ENCOUNTER — NURSING HOME VISIT (OUTPATIENT)
Dept: POST ACUTE CARE | Facility: EXTERNAL LOCATION | Age: 89
End: 2024-05-02
Payer: MEDICARE

## 2024-05-02 DIAGNOSIS — I50.9 CONGESTIVE HEART FAILURE, UNSPECIFIED HF CHRONICITY, UNSPECIFIED HEART FAILURE TYPE (MULTI): ICD-10-CM

## 2024-05-02 DIAGNOSIS — R53.1 WEAKNESS: Primary | ICD-10-CM

## 2024-05-02 DIAGNOSIS — Z78.9 ADVANCE DIRECTIVE IN CHART: ICD-10-CM

## 2024-05-02 DIAGNOSIS — R23.9 ALTERATION IN SKIN INTEGRITY: ICD-10-CM

## 2024-05-02 DIAGNOSIS — E44.0 MODERATE PROTEIN-CALORIE MALNUTRITION (MULTI): ICD-10-CM

## 2024-05-02 PROCEDURE — 99309 SBSQ NF CARE MODERATE MDM 30: CPT | Performed by: NURSE PRACTITIONER

## 2024-05-02 NOTE — LETTER
Patient: Suleman Marquis  : 1935    Encounter Date: 2024    PROGRESS NOTE    Subjective  Chief complaint: Suleman Marquis is a 88 y.o. female who is an acute skilled patient being seen and evaluated for weakness    HPI:  just finished therapy session, this therapist reports that she is making progress and walking a greater distance. She is napping in bed. Denies any discomfort.  Denies chest pain or tightness. Nursing reports that she is taking supplements   HPI      Objective  Vital signs: 112/67-82-18-97.4     Physical Exam  Vitals and nursing note reviewed.   Constitutional:       General: She is not in acute distress.     Appearance: She is underweight.   Eyes:      Extraocular Movements: Extraocular movements intact.   Cardiovascular:      Rate and Rhythm: Normal rate and regular rhythm.   Pulmonary:      Effort: Pulmonary effort is normal.      Breath sounds: Normal breath sounds.      Comments: Lungs clear   Abdominal:      General: Bowel sounds are normal.      Palpations: Abdomen is soft.   Musculoskeletal:      Cervical back: Neck supple.      Right lower leg: No edema.      Left lower leg: No edema.   Neurological:      Mental Status: She is alert.   Psychiatric:         Mood and Affect: Mood normal.         Behavior: Behavior is cooperative.         Assessment/Plan  Problem List Items Addressed This Visit       CHF (congestive heart failure) (Multi)     Echo revealed EF of 68% 2024  Lasix  Monitor for shortness of breath         Weakness - Primary     Continue working in therapy towards goals  Therapist reports that she is making slow progress          Alteration in skin integrity     Chest wound  Wound bed is pink   TX to apply metronidazole gel to wound    Wound care provider to follow in the facility          Malnutrition (Multi)     Malnutrition HX advanced cancer  Monitor intake   Dietitian to evaluate  Taking Supplements   Labs            Advance directive in chart     Family  meeting the patient, her son (by phone) ROOSEVELT and GERA   Advanced directives discussed and changed to - DNRcc  The patient requested  anastrozole be discontinued. Her son is in full agreement           Medications, treatments, and labs reviewed  Continue medications and treatments as listed in EMR    DAVID Pressley      Electronically Signed By: DAVID Pressley   5/4/24  3:39 PM

## 2024-05-04 PROBLEM — Z78.9 ADVANCE DIRECTIVE IN CHART: Status: ACTIVE | Noted: 2024-05-04

## 2024-05-04 NOTE — PROGRESS NOTES
PROGRESS NOTE    Subjective   Chief complaint: Suleman Marquis is a 88 y.o. female who is an acute skilled patient being seen and evaluated for weakness    HPI:  just finished therapy session, this therapist reports that she is making progress and walking a greater distance. She is napping in bed. Denies any discomfort.  Denies chest pain or tightness. Nursing reports that she is taking supplements   HPI      Objective   Vital signs: 112/67-82-18-97.4     Physical Exam  Vitals and nursing note reviewed.   Constitutional:       General: She is not in acute distress.     Appearance: She is underweight.   Eyes:      Extraocular Movements: Extraocular movements intact.   Cardiovascular:      Rate and Rhythm: Normal rate and regular rhythm.   Pulmonary:      Effort: Pulmonary effort is normal.      Breath sounds: Normal breath sounds.      Comments: Lungs clear   Abdominal:      General: Bowel sounds are normal.      Palpations: Abdomen is soft.   Musculoskeletal:      Cervical back: Neck supple.      Right lower leg: No edema.      Left lower leg: No edema.   Neurological:      Mental Status: She is alert.   Psychiatric:         Mood and Affect: Mood normal.         Behavior: Behavior is cooperative.         Assessment/Plan   Problem List Items Addressed This Visit       CHF (congestive heart failure) (Multi)     Echo revealed EF of 68% February 2024  Lasix  Monitor for shortness of breath         Weakness - Primary     Continue working in therapy towards goals  Therapist reports that she is making slow progress          Alteration in skin integrity     Chest wound  Wound bed is pink   TX to apply metronidazole gel to wound    Wound care provider to follow in the facility          Malnutrition (Multi)     Malnutrition HX advanced cancer  Monitor intake   Dietitian to evaluate  Taking Supplements   Labs            Advance directive in chart     Family meeting the patient, her son (by phone) SW and DON   Advanced  directives discussed and changed to - DNRcc  The patient requested  anastrozole be discontinued. Her son is in full agreement           Medications, treatments, and labs reviewed  Continue medications and treatments as listed in EMR    Nikki Pressley, APRN-CNP

## 2024-05-04 NOTE — ASSESSMENT & PLAN NOTE
Family meeting the patient, her son (by phone) ROOSEVELT and GERA   Advanced directives discussed and changed to - DNRcc  The patient requested  anastrozole be discontinued. Her son is in full agreement

## 2024-05-04 NOTE — PROGRESS NOTES
PROGRESS NOTE    Subjective   Chief complaint: Suleman Marquis is a 88 y.o. female who is an acute skilled patient being seen and evaluated for weakness    HPI: .  Remains in therapy.  The physical therapist reports that she is making progress.  She is alert and talkative.  Denies any chest pain or tightness is taking supplements.   Family meeting was held this morning with the  and director of nursing including both the patient and her son.  Her advanced directives were changed to a DO NOT RESUSCITATE and she requested that the anastrozole be discontinued.   HPI      Objective   Vital signs: 112/65-72-18-97.4     Physical Exam  Vitals and nursing note reviewed.   Constitutional:       General: She is not in acute distress.     Appearance: She is underweight.   Eyes:      Extraocular Movements: Extraocular movements intact.   Cardiovascular:      Rate and Rhythm: Normal rate and regular rhythm.   Pulmonary:      Effort: Pulmonary effort is normal.      Breath sounds: Normal breath sounds.      Comments: Lungs clear   Abdominal:      General: Bowel sounds are normal.      Palpations: Abdomen is soft.   Musculoskeletal:      Cervical back: Neck supple.      Right lower leg: No edema.      Left lower leg: No edema.   Neurological:      Mental Status: She is alert.   Psychiatric:         Mood and Affect: Mood normal.         Behavior: Behavior is cooperative.         Assessment/Plan   Problem List Items Addressed This Visit       Hypertension     Monitor BP  Metoprolol  Monitor labs          GERD (gastroesophageal reflux disease)     PPI  Monitor for GI symptoms         Weakness - Primary     Continue working in therapy towards goals  Therapist reports that she is making slow progress          Malnutrition (Multi)     Malnutrition HX advanced cancer  Monitor intake   Dietitian to evaluate  Taking Supplements   Labs            Advance directive in chart     Family meeting was held this morning the patient,  her son (by phone) SW and GERA   Advanced directives discussed and changed to - DNRcc  The patient requested  anastrozole be discontinued. Her son is in full agreement           Medications, treatments, and labs reviewed  Continue medications and treatments as listed in EMR    Nikki Pressley, APRN-CNP

## 2024-05-04 NOTE — ASSESSMENT & PLAN NOTE
Family meeting was held this morning the patient, her son (by phone) ROOSEVELT and GERA   Advanced directives discussed and changed to - DNRcc  The patient requested  anastrozole be discontinued. Her son is in full agreement

## 2024-05-07 ENCOUNTER — NURSING HOME VISIT (OUTPATIENT)
Dept: POST ACUTE CARE | Facility: EXTERNAL LOCATION | Age: 89
End: 2024-05-07
Payer: MEDICARE

## 2024-05-07 DIAGNOSIS — R53.1 WEAKNESS: Primary | ICD-10-CM

## 2024-05-07 DIAGNOSIS — I15.8 OTHER SECONDARY HYPERTENSION: ICD-10-CM

## 2024-05-07 DIAGNOSIS — E44.0 MODERATE PROTEIN-CALORIE MALNUTRITION (MULTI): ICD-10-CM

## 2024-05-07 DIAGNOSIS — R23.9 ALTERATION IN SKIN INTEGRITY: ICD-10-CM

## 2024-05-07 DIAGNOSIS — K21.9 GASTROESOPHAGEAL REFLUX DISEASE, UNSPECIFIED WHETHER ESOPHAGITIS PRESENT: ICD-10-CM

## 2024-05-07 PROCEDURE — 99309 SBSQ NF CARE MODERATE MDM 30: CPT | Performed by: NURSE PRACTITIONER

## 2024-05-07 NOTE — LETTER
Patient: Suleman Marquis  : 1935    Encounter Date: 2024    PROGRESS NOTE    Subjective  Chief complaint: Suleman Marquis is a 88 y.o. female who is an acute skilled patient being seen and evaluated for weakness    HPI:   Therapist reports that she continues to make progress.  She is currently in her room sitting in wheelchair -smiling when approached.  Denies any chest pain or tightness. Nursing reports that her appetite is slowly improving.  She is taking nutritional supplements.  HPI      Objective  Vital signs: 116/72-82-18-97.4    Physical Exam  Vitals and nursing note reviewed.   Constitutional:       General: She is not in acute distress.     Appearance: She is underweight.   Eyes:      Extraocular Movements: Extraocular movements intact.   Cardiovascular:      Rate and Rhythm: Normal rate and regular rhythm.   Pulmonary:      Effort: Pulmonary effort is normal.      Breath sounds: Normal breath sounds.      Comments: Lungs clear   Abdominal:      General: Bowel sounds are normal.      Palpations: Abdomen is soft.   Musculoskeletal:      Cervical back: Neck supple.      Right lower leg: No edema.      Left lower leg: No edema.   Neurological:      Mental Status: She is alert.   Psychiatric:         Mood and Affect: Mood normal.         Behavior: Behavior is cooperative.         Assessment/Plan  Problem List Items Addressed This Visit       Hypertension     Monitor BP  Metoprolol  Monitor labs          GERD (gastroesophageal reflux disease)     PPI  Monitor for GI symptoms  Appetite improved         Weakness - Primary     Continue working in therapy towards goals  Therapist reports that she is making slow progress          Alteration in skin integrity     Chest wound  Wound bed is pink   TX to apply metronidazole gel to wound    Wound care provider to follow in the facility          Malnutrition (Multi)     Malnutrition HX advanced cancer  Monitor intake   Dietitian to evaluate  Taking Supplements    Labs             Medications, treatments, and labs reviewed  Continue medications and treatments as listed in EMR    DAVID Pressley      Electronically Signed By: DAVID Pressley   5/7/24  7:59 PM

## 2024-05-07 NOTE — PROGRESS NOTES
PROGRESS NOTE    Subjective   Chief complaint: Suleman Marquis is a 88 y.o. female who is an acute skilled patient being seen and evaluated for weakness    HPI:   Therapist reports that she continues to make progress.  She is currently in her room sitting in wheelchair -smiling when approached.  Denies any chest pain or tightness. Nursing reports that her appetite is slowly improving.  She is taking nutritional supplements.  HPI      Objective   Vital signs: 116/72-82-18-97.4    Physical Exam  Vitals and nursing note reviewed.   Constitutional:       General: She is not in acute distress.     Appearance: She is underweight.   Eyes:      Extraocular Movements: Extraocular movements intact.   Cardiovascular:      Rate and Rhythm: Normal rate and regular rhythm.   Pulmonary:      Effort: Pulmonary effort is normal.      Breath sounds: Normal breath sounds.      Comments: Lungs clear   Abdominal:      General: Bowel sounds are normal.      Palpations: Abdomen is soft.   Musculoskeletal:      Cervical back: Neck supple.      Right lower leg: No edema.      Left lower leg: No edema.   Neurological:      Mental Status: She is alert.   Psychiatric:         Mood and Affect: Mood normal.         Behavior: Behavior is cooperative.         Assessment/Plan   Problem List Items Addressed This Visit       Hypertension     Monitor BP  Metoprolol  Monitor labs          GERD (gastroesophageal reflux disease)     PPI  Monitor for GI symptoms  Appetite improved         Weakness - Primary     Continue working in therapy towards goals  Therapist reports that she is making slow progress          Alteration in skin integrity     Chest wound  Wound bed is pink   TX to apply metronidazole gel to wound    Wound care provider to follow in the facility          Malnutrition (Multi)     Malnutrition HX advanced cancer  Monitor intake   Dietitian to evaluate  Taking Supplements   Labs             Medications, treatments, and labs  reviewed  Continue medications and treatments as listed in EMR    Nikki Pressley, APRN-CNP

## 2024-05-08 ENCOUNTER — NURSING HOME VISIT (OUTPATIENT)
Dept: POST ACUTE CARE | Facility: EXTERNAL LOCATION | Age: 89
End: 2024-05-08
Payer: MEDICARE

## 2024-05-08 DIAGNOSIS — I50.9 CONGESTIVE HEART FAILURE, UNSPECIFIED HF CHRONICITY, UNSPECIFIED HEART FAILURE TYPE (MULTI): ICD-10-CM

## 2024-05-08 DIAGNOSIS — K21.9 GASTROESOPHAGEAL REFLUX DISEASE, UNSPECIFIED WHETHER ESOPHAGITIS PRESENT: ICD-10-CM

## 2024-05-08 DIAGNOSIS — I15.8 OTHER SECONDARY HYPERTENSION: ICD-10-CM

## 2024-05-08 DIAGNOSIS — R53.1 WEAKNESS: Primary | ICD-10-CM

## 2024-05-08 PROCEDURE — 99308 SBSQ NF CARE LOW MDM 20: CPT | Performed by: INTERNAL MEDICINE

## 2024-05-08 NOTE — LETTER
Patient: Suleman Marquis  : 1935    Encounter Date: 2024    PROGRESS NOTE    Subjective  Chief complaint: Suleman Marquis is a 88 y.o. female who is an acute skilled patient being seen and evaluated for weakness    HPI:  HPI  Patient does continue working in therapy due to generalized weakness.  Therapy is working with patient focusing on therapeutic exercise and activities, neuromuscular education, gait training and transfers and ADLs and self-care.  Patient was seen and examined at the bedside, appears to be in no acute distress.  Patient denies constitutional symptoms.  Nursing staff voicing no new concerns.    Objective  Vital signs: 104/58, 98.2, 78, 18, 96%    Physical Exam  Constitutional:       General: She is not in acute distress.  Eyes:      Extraocular Movements: Extraocular movements intact.   Cardiovascular:      Rate and Rhythm: Normal rate and regular rhythm.   Pulmonary:      Effort: Pulmonary effort is normal.      Breath sounds: Normal breath sounds.   Abdominal:      General: Bowel sounds are normal.      Palpations: Abdomen is soft.   Musculoskeletal:      Cervical back: Neck supple.      Right lower leg: No edema.      Left lower leg: No edema.   Skin:     Comments: Dressing to chest clean dry and intact   Neurological:      Mental Status: She is alert.      Motor: Weakness present.   Psychiatric:         Mood and Affect: Mood normal.         Behavior: Behavior is cooperative.         Assessment/Plan  Problem List Items Addressed This Visit       Hypertension     Monitor BP  Metoprolol  BP at goal         GERD (gastroesophageal reflux disease)     PPI  Monitor for GI symptoms         CHF (congestive heart failure) (Multi)     Echo revealed EF of 68% 2024  Lasix  Monitor for shortness of breath, stable         Weakness - Primary     Continue working towards established goals in therapy          Medications, treatments, and labs reviewed  Continue medications and treatments  as listed in EMR      Scribe Attestation  I, Charity Baker   attest that this documentation has been prepared under the direction and in the presence of Jacqueline Singh MD    Provider Attestation - Scribe documentation  All medical record entries made by the Scribe were at my direction and personally dictated by me. I have reviewed the chart and agree that the record accurately reflects my personal performance of the history, physical exam, discussion and plan.   Jacqueline Singh MD        Electronically Signed By: Jacqueline Singh MD   5/8/24  3:54 PM

## 2024-05-08 NOTE — PROGRESS NOTES
PROGRESS NOTE    Subjective   Chief complaint: Suleman Marquis is a 88 y.o. female who is an acute skilled patient being seen and evaluated for weakness    HPI:  HPI  Patient does continue working in therapy due to generalized weakness.  Therapy is working with patient focusing on therapeutic exercise and activities, neuromuscular education, gait training and transfers and ADLs and self-care.  Patient was seen and examined at the bedside, appears to be in no acute distress.  Patient denies constitutional symptoms.  Nursing staff voicing no new concerns.    Objective   Vital signs: 104/58, 98.2, 78, 18, 96%    Physical Exam  Constitutional:       General: She is not in acute distress.  Eyes:      Extraocular Movements: Extraocular movements intact.   Cardiovascular:      Rate and Rhythm: Normal rate and regular rhythm.   Pulmonary:      Effort: Pulmonary effort is normal.      Breath sounds: Normal breath sounds.   Abdominal:      General: Bowel sounds are normal.      Palpations: Abdomen is soft.   Musculoskeletal:      Cervical back: Neck supple.      Right lower leg: No edema.      Left lower leg: No edema.   Skin:     Comments: Dressing to chest clean dry and intact   Neurological:      Mental Status: She is alert.      Motor: Weakness present.   Psychiatric:         Mood and Affect: Mood normal.         Behavior: Behavior is cooperative.         Assessment/Plan   Problem List Items Addressed This Visit       Hypertension     Monitor BP  Metoprolol  BP at goal         GERD (gastroesophageal reflux disease)     PPI  Monitor for GI symptoms         CHF (congestive heart failure) (Multi)     Echo revealed EF of 68% February 2024  Lasix  Monitor for shortness of breath, stable         Weakness - Primary     Continue working towards established goals in therapy          Medications, treatments, and labs reviewed  Continue medications and treatments as listed in EMR      Charity Jolley I, Charity Baker    attest that this documentation has been prepared under the direction and in the presence of Jacqueline Singh MD    Provider Attestation - Scribe documentation  All medical record entries made by the Scribe were at my direction and personally dictated by me. I have reviewed the chart and agree that the record accurately reflects my personal performance of the history, physical exam, discussion and plan.   Jacqueline Singh MD

## 2024-05-09 ENCOUNTER — NURSING HOME VISIT (OUTPATIENT)
Dept: POST ACUTE CARE | Facility: EXTERNAL LOCATION | Age: 89
End: 2024-05-09
Payer: MEDICARE

## 2024-05-09 DIAGNOSIS — I15.8 OTHER SECONDARY HYPERTENSION: ICD-10-CM

## 2024-05-09 DIAGNOSIS — E44.0 MODERATE PROTEIN-CALORIE MALNUTRITION (MULTI): ICD-10-CM

## 2024-05-09 DIAGNOSIS — R23.9 ALTERATION IN SKIN INTEGRITY: ICD-10-CM

## 2024-05-09 DIAGNOSIS — I50.9 CONGESTIVE HEART FAILURE, UNSPECIFIED HF CHRONICITY, UNSPECIFIED HEART FAILURE TYPE (MULTI): ICD-10-CM

## 2024-05-09 DIAGNOSIS — R53.1 WEAKNESS: Primary | ICD-10-CM

## 2024-05-09 PROCEDURE — 99309 SBSQ NF CARE MODERATE MDM 30: CPT | Performed by: NURSE PRACTITIONER

## 2024-05-09 NOTE — LETTER
Patient: Sulmean Marquis  : 1935    Encounter Date: 2024    PROGRESS NOTE    Subjective  Chief complaint: Suleman Marquis is a 88 y.o. female who is an acute skilled patient being seen and evaluated for weakness    HPI: Therapist reports that she is making progress in therapy. Alert and talkative.   Is being assisted with AM care. Nursing staff reports her intake has improved. Denies any discomfort  HPI      Objective  Vital signs: 121/83-78-18-97.3    Physical Exam  Vitals and nursing note reviewed.   Constitutional:       General: She is not in acute distress.     Appearance: She is underweight.   Eyes:      Extraocular Movements: Extraocular movements intact.   Cardiovascular:      Rate and Rhythm: Normal rate and regular rhythm.   Pulmonary:      Effort: Pulmonary effort is normal.      Breath sounds: Normal breath sounds.      Comments: Lungs clear   Abdominal:      General: Bowel sounds are normal.      Palpations: Abdomen is soft.   Musculoskeletal:      Cervical back: Neck supple.      Right lower leg: No edema.      Left lower leg: No edema.   Neurological:      Mental Status: She is alert.   Psychiatric:         Mood and Affect: Mood normal.         Behavior: Behavior is cooperative.         Assessment/Plan  Problem List Items Addressed This Visit       Hypertension     Monitor BP  Metoprolol  Monitor labs          CHF (congestive heart failure) (Multi)     Echo revealed EF of 68% 2024  Lasix  Monitor for shortness of breath         Weakness - Primary     Continue working in therapy towards goals  Therapist reports that she is making slow progress          Alteration in skin integrity     Chest wound  Wound bed is pink   TX to apply metronidazole gel to wound    Wound care provider to follow in the facility            Malnutrition (Multi)     Malnutrition HX advanced cancer  Monitor intake   Dietitian to evaluate  Taking Supplements   Labs             Medications, treatments, and labs  reviewed  Continue medications and treatments as listed in EMR    DAVID Pressley      Electronically Signed By: DAVID Pressley   5/11/24  1:41 PM

## 2024-05-09 NOTE — PROGRESS NOTES
PROGRESS NOTE    Subjective   Chief complaint: Suleman Marquis is a 88 y.o. female who is an acute skilled patient being seen and evaluated for weakness    HPI: Therapist reports that she is making progress in therapy. Alert and talkative.   Is being assisted with AM care. Nursing staff reports her intake has improved. Denies any discomfort  HPI      Objective   Vital signs: 121/83-78-18-97.3    Physical Exam  Vitals and nursing note reviewed.   Constitutional:       General: She is not in acute distress.     Appearance: She is underweight.   Eyes:      Extraocular Movements: Extraocular movements intact.   Cardiovascular:      Rate and Rhythm: Normal rate and regular rhythm.   Pulmonary:      Effort: Pulmonary effort is normal.      Breath sounds: Normal breath sounds.      Comments: Lungs clear   Abdominal:      General: Bowel sounds are normal.      Palpations: Abdomen is soft.   Musculoskeletal:      Cervical back: Neck supple.      Right lower leg: No edema.      Left lower leg: No edema.   Neurological:      Mental Status: She is alert.   Psychiatric:         Mood and Affect: Mood normal.         Behavior: Behavior is cooperative.         Assessment/Plan   Problem List Items Addressed This Visit       Hypertension     Monitor BP  Metoprolol  Monitor labs          CHF (congestive heart failure) (Multi)     Echo revealed EF of 68% February 2024  Lasix  Monitor for shortness of breath         Weakness - Primary     Continue working in therapy towards goals  Therapist reports that she is making slow progress          Alteration in skin integrity     Chest wound  Wound bed is pink   TX to apply metronidazole gel to wound    Wound care provider to follow in the facility            Malnutrition (Multi)     Malnutrition HX advanced cancer  Monitor intake   Dietitian to evaluate  Taking Supplements   Labs             Medications, treatments, and labs reviewed  Continue medications and treatments as listed in  EMR    Nikki Pressley, APRN-CNP

## 2024-05-11 ENCOUNTER — NURSING HOME VISIT (OUTPATIENT)
Dept: POST ACUTE CARE | Facility: EXTERNAL LOCATION | Age: 89
End: 2024-05-11
Payer: MEDICARE

## 2024-05-11 DIAGNOSIS — R53.1 WEAKNESS: ICD-10-CM

## 2024-05-11 DIAGNOSIS — I50.9 CONGESTIVE HEART FAILURE, UNSPECIFIED HF CHRONICITY, UNSPECIFIED HEART FAILURE TYPE (MULTI): ICD-10-CM

## 2024-05-11 DIAGNOSIS — K21.9 GASTROESOPHAGEAL REFLUX DISEASE, UNSPECIFIED WHETHER ESOPHAGITIS PRESENT: ICD-10-CM

## 2024-05-11 DIAGNOSIS — I15.8 OTHER SECONDARY HYPERTENSION: ICD-10-CM

## 2024-05-11 PROCEDURE — 99308 SBSQ NF CARE LOW MDM 20: CPT | Performed by: INTERNAL MEDICINE

## 2024-05-11 NOTE — LETTER
Patient: Suleman Marquis  : 1935    Encounter Date: 2024    PROGRESS NOTE    Subjective  Chief complaint: Suleman Marquis is a 88 y.o. female who is an acute skilled patient being seen and evaluated for weakness    HPI:  Patient presents for f/u therapy and general medical care.  Patient seen and examined at beside.  Therapy has been working with the patient to improve strength, endurance, ADLs, and transfers d/t generalized weakness.  Patient has no acute concerns today.      Objective  Vital signs: 118/76,76,97%    Physical Exam  Vitals and nursing note reviewed.   Constitutional:       General: She is not in acute distress.     Appearance: She is underweight.   Eyes:      Extraocular Movements: Extraocular movements intact.   Cardiovascular:      Rate and Rhythm: Normal rate and regular rhythm.   Pulmonary:      Effort: Pulmonary effort is normal.      Breath sounds: Normal breath sounds.      Comments: Lungs clear   Abdominal:      General: Bowel sounds are normal.      Palpations: Abdomen is soft.   Musculoskeletal:      Cervical back: Neck supple.      Right lower leg: No edema.      Left lower leg: No edema.   Neurological:      Mental Status: She is alert.   Psychiatric:         Mood and Affect: Mood normal.         Behavior: Behavior is cooperative.         Assessment/Plan  Problem List Items Addressed This Visit       Hypertension     Monitor BP  Metoprolol  Monitor labs          GERD (gastroesophageal reflux disease)     PPI  Monitor for GI symptoms         CHF (congestive heart failure) (Multi)     Lasix  Monitor for shortness of breath         Weakness     Continue working in therapy towards goals            Medications, treatments, and labs reviewed  Continue medications and treatments as listed in EMR    Scribe Attestation  I, Snehal Mcguire, Jeanieibkatherine   attest that this documentation has been prepared under the direction and in the presence of Jacqueline Singh MD.     Provider Attestation -  Scribe documentation  All medical record entries made by the Scribe were at my direction and personally dictated by me. I have reviewed the chart and agree that the record accurately reflects my personal performance of the history, physical exam, discussion and plan.   Jacqueline Singh MD      Electronically Signed By: Jacqueline Singh MD   5/13/24  1:27 PM

## 2024-05-13 NOTE — PROGRESS NOTES
PROGRESS NOTE    Subjective   Chief complaint: Suleman Marquis is a 88 y.o. female who is an acute skilled patient being seen and evaluated for weakness    HPI:  Patient presents for f/u therapy and general medical care.  Patient seen and examined at beside.  Therapy has been working with the patient to improve strength, endurance, ADLs, and transfers d/t generalized weakness.  Patient has no acute concerns today.      Objective   Vital signs: 118/76,76,97%    Physical Exam  Vitals and nursing note reviewed.   Constitutional:       General: She is not in acute distress.     Appearance: She is underweight.   Eyes:      Extraocular Movements: Extraocular movements intact.   Cardiovascular:      Rate and Rhythm: Normal rate and regular rhythm.   Pulmonary:      Effort: Pulmonary effort is normal.      Breath sounds: Normal breath sounds.      Comments: Lungs clear   Abdominal:      General: Bowel sounds are normal.      Palpations: Abdomen is soft.   Musculoskeletal:      Cervical back: Neck supple.      Right lower leg: No edema.      Left lower leg: No edema.   Neurological:      Mental Status: She is alert.   Psychiatric:         Mood and Affect: Mood normal.         Behavior: Behavior is cooperative.         Assessment/Plan   Problem List Items Addressed This Visit       Hypertension     Monitor BP  Metoprolol  Monitor labs          GERD (gastroesophageal reflux disease)     PPI  Monitor for GI symptoms         CHF (congestive heart failure) (Multi)     Lasix  Monitor for shortness of breath         Weakness     Continue working in therapy towards goals            Medications, treatments, and labs reviewed  Continue medications and treatments as listed in EMR    Scribe Attestation  ISnehal Scribe   attest that this documentation has been prepared under the direction and in the presence of Jacqueline Singh MD.     Provider Attestation - Scribe documentation  All medical record entries made by the Scribe  were at my direction and personally dictated by me. I have reviewed the chart and agree that the record accurately reflects my personal performance of the history, physical exam, discussion and plan.   Jacqueline Singh MD

## 2024-05-14 ENCOUNTER — NURSING HOME VISIT (OUTPATIENT)
Dept: POST ACUTE CARE | Facility: EXTERNAL LOCATION | Age: 89
End: 2024-05-14
Payer: MEDICARE

## 2024-05-14 DIAGNOSIS — K21.9 GASTROESOPHAGEAL REFLUX DISEASE, UNSPECIFIED WHETHER ESOPHAGITIS PRESENT: ICD-10-CM

## 2024-05-14 DIAGNOSIS — R23.9 ALTERATION IN SKIN INTEGRITY: Primary | ICD-10-CM

## 2024-05-14 DIAGNOSIS — R53.1 WEAKNESS: ICD-10-CM

## 2024-05-14 DIAGNOSIS — Z78.9 ADVANCE DIRECTIVE IN CHART: ICD-10-CM

## 2024-05-14 DIAGNOSIS — Z86.711 HISTORY OF PULMONARY EMBOLUS (PE): ICD-10-CM

## 2024-05-14 DIAGNOSIS — E44.0 MODERATE PROTEIN-CALORIE MALNUTRITION (MULTI): ICD-10-CM

## 2024-05-14 DIAGNOSIS — I15.8 OTHER SECONDARY HYPERTENSION: ICD-10-CM

## 2024-05-14 PROCEDURE — 99309 SBSQ NF CARE MODERATE MDM 30: CPT | Performed by: NURSE PRACTITIONER

## 2024-05-14 NOTE — LETTER
Patient: Suleman Marquis  : 1935    Encounter Date: 2024    PROGRESS NOTE    Subjective  Chief complaint: Suleman Marquis is a 88 y.o. female who is an acute skilled patient being seen and evaluated for weakness    HPI: .  She is in bed sleeping soundly.  Oxygen in place.  Appears comfortable.  Nursing reports that she has been quiet most of the day,  taking sips of fluid.    Visible weight loss.   Spoke with SW who reports that she is to be discharged home on hospice care with her son.   HPI      Objective  Vital signs: 110/61-72-`8-97.2     Physical Exam  Vitals and nursing note reviewed.   Constitutional:       General: She is sleeping. She is not in acute distress.     Appearance: She is underweight.      Comments: Sleeping soundly , wearing oxygen    Eyes:      Extraocular Movements: Extraocular movements intact.   Cardiovascular:      Rate and Rhythm: Normal rate and regular rhythm.   Pulmonary:      Effort: Pulmonary effort is normal.      Breath sounds: Normal breath sounds.   Abdominal:      General: Bowel sounds are normal.      Palpations: Abdomen is soft.   Musculoskeletal:      Cervical back: Neck supple.      Right lower leg: No edema.      Left lower leg: No edema.         Assessment/Plan  Problem List Items Addressed This Visit       Hypertension     Monitor BP  Metoprolol  BP at goal         GERD (gastroesophageal reflux disease)     PPI  Monitor for GI symptoms         Weakness     Remains in therapy   Will be discharged home with hospice care with son            History of pulmonary embolus (PE)     Remains on coumadin   INR monitored   OXYGEN THERAPY PRN          Alteration in skin integrity - Primary     Chest wound   Wound care provider to follow in the facility            Malnutrition (Multi)     Malnutrition HX advanced cancer  Monitor intake   Dietitian to evaluate  Taking Supplements   Labs            Advance directive in chart     Family meeting the patient, her son (by phone)  ROOSEVELT and GERA   Advanced directives discussed and changed to - DNRcc  The patient requested  anastrozole be discontinued. Her son is in full agreement             Medications, treatments, and labs reviewed  Continue medications and treatments as listed in EMR    DAVID Pressley      Electronically Signed By: DAVID Pressley   5/18/24  2:03 PM

## 2024-05-14 NOTE — PROGRESS NOTES
PROGRESS NOTE    Subjective   Chief complaint: Suleman Marquis is a 88 y.o. female who is an acute skilled patient being seen and evaluated for weakness    HPI: .  She is in bed sleeping soundly.  Oxygen in place.  Appears comfortable.  Nursing reports that she has been quiet most of the day,  taking sips of fluid.    Visible weight loss.   Spoke with ROOSEVELT who reports that she is to be discharged home on hospice care with her son.   HPI      Objective   Vital signs: 110/61-72-`8-97.2     Physical Exam  Vitals and nursing note reviewed.   Constitutional:       General: She is sleeping. She is not in acute distress.     Appearance: She is underweight.      Comments: Sleeping soundly , wearing oxygen    Eyes:      Extraocular Movements: Extraocular movements intact.   Cardiovascular:      Rate and Rhythm: Normal rate and regular rhythm.   Pulmonary:      Effort: Pulmonary effort is normal.      Breath sounds: Normal breath sounds.   Abdominal:      General: Bowel sounds are normal.      Palpations: Abdomen is soft.   Musculoskeletal:      Cervical back: Neck supple.      Right lower leg: No edema.      Left lower leg: No edema.         Assessment/Plan   Problem List Items Addressed This Visit       Hypertension     Monitor BP  Metoprolol  BP at goal         GERD (gastroesophageal reflux disease)     PPI  Monitor for GI symptoms         Weakness     Remains in therapy   Will be discharged home with hospice care with son            History of pulmonary embolus (PE)     Remains on coumadin   INR monitored   OXYGEN THERAPY PRN          Alteration in skin integrity - Primary     Chest wound   Wound care provider to follow in the facility            Malnutrition (Multi)     Malnutrition HX advanced cancer  Monitor intake   Dietitian to evaluate  Taking Supplements   Labs            Advance directive in chart     Family meeting the patient, her son (by phone) ROOSEVELT and GERA   Advanced directives discussed and changed to -  DNRcc  The patient requested  anastrozole be discontinued. Her son is in full agreement             Medications, treatments, and labs reviewed  Continue medications and treatments as listed in EMR    Nikki Pressley, APRN-CNP

## 2024-05-15 ENCOUNTER — NURSING HOME VISIT (OUTPATIENT)
Dept: POST ACUTE CARE | Facility: EXTERNAL LOCATION | Age: 89
End: 2024-05-15
Payer: MEDICARE

## 2024-05-15 DIAGNOSIS — I15.8 OTHER SECONDARY HYPERTENSION: ICD-10-CM

## 2024-05-15 DIAGNOSIS — I50.9 CONGESTIVE HEART FAILURE, UNSPECIFIED HF CHRONICITY, UNSPECIFIED HEART FAILURE TYPE (MULTI): ICD-10-CM

## 2024-05-15 DIAGNOSIS — R53.1 WEAKNESS: Primary | ICD-10-CM

## 2024-05-15 DIAGNOSIS — K21.9 GASTROESOPHAGEAL REFLUX DISEASE, UNSPECIFIED WHETHER ESOPHAGITIS PRESENT: ICD-10-CM

## 2024-05-15 PROCEDURE — 99308 SBSQ NF CARE LOW MDM 20: CPT | Performed by: INTERNAL MEDICINE

## 2024-05-15 NOTE — PROGRESS NOTES
PROGRESS NOTE    Subjective   Chief complaint: Suleman Marquis is a 88 y.o. female who is an acute skilled patient being seen and evaluated for weakness    HPI:  HPI  Patient is continuing in therapy.  Patient is working with PT OT.  Patient is working on transfers from various surfaces requiring min assist to mod assist to be completed.  Patient is also working on ADL retraining.  Patient seen and examined at bedside, appears to be in no acute distress.  Nursing staff voicing no new concerns.  Patient denies constitutional symptoms.    Objective   Vital signs: 110/61, 98.4, 72, 18, 96%    Physical Exam  Constitutional:       General: She is not in acute distress.  Eyes:      Extraocular Movements: Extraocular movements intact.   Cardiovascular:      Rate and Rhythm: Normal rate and regular rhythm.   Pulmonary:      Effort: Pulmonary effort is normal.      Breath sounds: Normal breath sounds.      Comments: O2  Abdominal:      General: Bowel sounds are normal.      Palpations: Abdomen is soft.   Musculoskeletal:      Cervical back: Neck supple.      Right lower leg: No edema.      Left lower leg: No edema.   Skin:     Comments: Dressing to chest clean dry and intact   Neurological:      Mental Status: She is alert.      Motor: Weakness present.   Psychiatric:         Mood and Affect: Mood normal.         Behavior: Behavior is cooperative.         Assessment/Plan   Problem List Items Addressed This Visit       Hypertension     Monitor BP  Metoprolol  BP at goal         GERD (gastroesophageal reflux disease)     PPI  Monitor for GI symptoms         CHF (congestive heart failure) (Multi)     Echo revealed EF of 68% February 2024  Lasix  Monitor for shortness of breath, stable         Weakness - Primary     Continue in therapy working towards goals          Medications, treatments, and labs reviewed  Continue medications and treatments as listed in EMR      Jeanieibkatherine Attestation  PATRICIA, Charity Baker   attest that  this documentation has been prepared under the direction and in the presence of Jacqueline Singh MD    Provider Attestation - Scribe documentation  All medical record entries made by the Scribe were at my direction and personally dictated by me. I have reviewed the chart and agree that the record accurately reflects my personal performance of the history, physical exam, discussion and plan.   Jacqueline Singh MD

## 2024-05-15 NOTE — LETTER
Patient: Suleman Marquis  : 1935    Encounter Date: 05/15/2024    PROGRESS NOTE    Subjective  Chief complaint: Suleman Marquis is a 88 y.o. female who is an acute skilled patient being seen and evaluated for weakness    HPI:  HPI  Patient is continuing in therapy.  Patient is working with PT OT.  Patient is working on transfers from various surfaces requiring min assist to mod assist to be completed.  Patient is also working on ADL retraining.  Patient seen and examined at bedside, appears to be in no acute distress.  Nursing staff voicing no new concerns.  Patient denies constitutional symptoms.    Objective  Vital signs: 110/61, 98.4, 72, 18, 96%    Physical Exam  Constitutional:       General: She is not in acute distress.  Eyes:      Extraocular Movements: Extraocular movements intact.   Cardiovascular:      Rate and Rhythm: Normal rate and regular rhythm.   Pulmonary:      Effort: Pulmonary effort is normal.      Breath sounds: Normal breath sounds.      Comments: O2  Abdominal:      General: Bowel sounds are normal.      Palpations: Abdomen is soft.   Musculoskeletal:      Cervical back: Neck supple.      Right lower leg: No edema.      Left lower leg: No edema.   Skin:     Comments: Dressing to chest clean dry and intact   Neurological:      Mental Status: She is alert.      Motor: Weakness present.   Psychiatric:         Mood and Affect: Mood normal.         Behavior: Behavior is cooperative.         Assessment/Plan  Problem List Items Addressed This Visit       Hypertension     Monitor BP  Metoprolol  BP at goal         GERD (gastroesophageal reflux disease)     PPI  Monitor for GI symptoms         CHF (congestive heart failure) (Multi)     Echo revealed EF of 68% 2024  Lasix  Monitor for shortness of breath, stable         Weakness - Primary     Continue in therapy working towards goals          Medications, treatments, and labs reviewed  Continue medications and treatments as listed in  EMR      Scribe Attestation  I, Charity Baker   attest that this documentation has been prepared under the direction and in the presence of Jacqueline Singh MD    Provider Attestation - Scribe documentation  All medical record entries made by the Scribe were at my direction and personally dictated by me. I have reviewed the chart and agree that the record accurately reflects my personal performance of the history, physical exam, discussion and plan.   Jacqueline Singh MD        Electronically Signed By: Jacqueline Singh MD   5/15/24  1:49 PM

## 2024-05-16 ENCOUNTER — NURSING HOME VISIT (OUTPATIENT)
Dept: POST ACUTE CARE | Facility: EXTERNAL LOCATION | Age: 89
End: 2024-05-16
Payer: MEDICARE

## 2024-05-16 DIAGNOSIS — K21.9 GASTROESOPHAGEAL REFLUX DISEASE, UNSPECIFIED WHETHER ESOPHAGITIS PRESENT: ICD-10-CM

## 2024-05-16 DIAGNOSIS — E44.0 MODERATE PROTEIN-CALORIE MALNUTRITION (MULTI): ICD-10-CM

## 2024-05-16 DIAGNOSIS — Z78.9 ADVANCE DIRECTIVE IN CHART: ICD-10-CM

## 2024-05-16 DIAGNOSIS — R23.9 ALTERATION IN SKIN INTEGRITY: Primary | ICD-10-CM

## 2024-05-16 DIAGNOSIS — R53.1 WEAKNESS: ICD-10-CM

## 2024-05-16 DIAGNOSIS — I15.8 OTHER SECONDARY HYPERTENSION: ICD-10-CM

## 2024-05-16 PROCEDURE — 99309 SBSQ NF CARE MODERATE MDM 30: CPT | Performed by: NURSE PRACTITIONER

## 2024-05-16 NOTE — PROGRESS NOTES
PROGRESS NOTE    Subjective   Chief complaint: Suleman Marquis is a 88 y.o. female who is an acute skilled patient being seen and evaluated for weakness    HPI: Is sitting up in a wheelchair. Continues to require oxygen therapy.   Smiles when approached. Denies any pain or discomfort.  ROOSEVELT reports that she is to be discharged home with hospice care with her son.      HPI      Objective   Vital signs: 102/67-73-18-97.3     Physical Exam  Vitals and nursing note reviewed.   Constitutional:       General: She is not in acute distress.     Appearance: She is well-groomed. She is cachectic.   Eyes:      Extraocular Movements: Extraocular movements intact.   Cardiovascular:      Rate and Rhythm: Normal rate and regular rhythm.   Pulmonary:      Effort: Pulmonary effort is normal.      Breath sounds: Normal breath sounds.      Comments: Lungs clear   Abdominal:      General: Bowel sounds are normal.      Palpations: Abdomen is soft.   Musculoskeletal:      Cervical back: Neck supple.      Right lower leg: No edema.      Left lower leg: No edema.   Neurological:      Mental Status: She is alert.   Psychiatric:         Mood and Affect: Mood normal.         Behavior: Behavior is cooperative.         Assessment/Plan   Problem List Items Addressed This Visit       Hypertension     Monitor BP  Metoprolol  BP at goal         GERD (gastroesophageal reflux disease)     PPI  Monitor for GI symptoms         Weakness     Is scheduled to be discharged home with hospice care with son            Alteration in skin integrity - Primary     Chest wound  Wound bed is pink   TX to apply metronidazole gel to wound    Wound care provider to follow in the facility            Malnutrition (Multi)     Malnutrition HX advanced cancer  Monitor intake   Dietitian to evaluate  Taking Supplements   Labs            Advance directive in chart     Family meeting the patient, her son (by phone) ROOSEVELT and GERA   Advanced directives discussed and changed to -  DNRcc  The patient requested  anastrozole be discontinued. Her son is in full agreement           Medications, treatments, and labs reviewed  Continue medications and treatments as listed in EMR    Nikki Pressley, APRN-CNP

## 2024-05-16 NOTE — LETTER
Patient: Suleman Marquis  : 1935    Encounter Date: 2024    PROGRESS NOTE    Subjective  Chief complaint: Suleman Marquis is a 88 y.o. female who is an acute skilled patient being seen and evaluated for weakness    HPI: Is sitting up in a wheelchair. Continues to require oxygen therapy.   Smiles when approached. Denies any pain or discomfort.  SW reports that she is to be discharged home with hospice care with her son.      HPI      Objective  Vital signs: 102/67-73-18-97.3     Physical Exam  Vitals and nursing note reviewed.   Constitutional:       General: She is not in acute distress.     Appearance: She is well-groomed. She is cachectic.   Eyes:      Extraocular Movements: Extraocular movements intact.   Cardiovascular:      Rate and Rhythm: Normal rate and regular rhythm.   Pulmonary:      Effort: Pulmonary effort is normal.      Breath sounds: Normal breath sounds.      Comments: Lungs clear   Abdominal:      General: Bowel sounds are normal.      Palpations: Abdomen is soft.   Musculoskeletal:      Cervical back: Neck supple.      Right lower leg: No edema.      Left lower leg: No edema.   Neurological:      Mental Status: She is alert.   Psychiatric:         Mood and Affect: Mood normal.         Behavior: Behavior is cooperative.         Assessment/Plan  Problem List Items Addressed This Visit       Hypertension     Monitor BP  Metoprolol  BP at goal         GERD (gastroesophageal reflux disease)     PPI  Monitor for GI symptoms         Weakness     Is scheduled to be discharged home with hospice care with son            Alteration in skin integrity - Primary     Chest wound  Wound bed is pink   TX to apply metronidazole gel to wound    Wound care provider to follow in the facility            Malnutrition (Multi)     Malnutrition HX advanced cancer  Monitor intake   Dietitian to evaluate  Taking Supplements   Labs            Advance directive in chart     Family meeting the patient, her son (by  phone) ROOSEVELT and GERA   Advanced directives discussed and changed to - DNRcc  The patient requested  anastrozole be discontinued. Her son is in full agreement           Medications, treatments, and labs reviewed  Continue medications and treatments as listed in EMR    DAVID Pressley      Electronically Signed By: DAVID Pressley   5/16/24  7:30 PM

## 2024-05-18 ENCOUNTER — NURSING HOME VISIT (OUTPATIENT)
Dept: POST ACUTE CARE | Facility: EXTERNAL LOCATION | Age: 89
End: 2024-05-18
Payer: MEDICARE

## 2024-05-18 DIAGNOSIS — K21.9 GASTROESOPHAGEAL REFLUX DISEASE, UNSPECIFIED WHETHER ESOPHAGITIS PRESENT: ICD-10-CM

## 2024-05-18 DIAGNOSIS — R53.1 WEAKNESS: ICD-10-CM

## 2024-05-18 DIAGNOSIS — I15.8 OTHER SECONDARY HYPERTENSION: ICD-10-CM

## 2024-05-18 DIAGNOSIS — I50.9 CONGESTIVE HEART FAILURE, UNSPECIFIED HF CHRONICITY, UNSPECIFIED HEART FAILURE TYPE (MULTI): ICD-10-CM

## 2024-05-18 PROCEDURE — 99308 SBSQ NF CARE LOW MDM 20: CPT | Performed by: INTERNAL MEDICINE

## 2024-05-18 NOTE — LETTER
Patient: Suleman Marquis  : 1935    Encounter Date: 2024    PROGRESS NOTE    Subjective  Chief complaint: Suleman Marquis is a 88 y.o. female who is an acute skilled patient being seen and evaluated for weakness    HPI:  Patient has been working in therapy to improve strength, endurance, and ADLs.  Patient continues to work toward goals. Pt ambulated with 2WW with S for distances of 148ft negotiating thresholds, obstacles, and performing multiple B/L turns with 25% increased upper trunk rotation with AD with min. path deviation with no quantifiable tracking. PTA notes mod 3 step through gait pattern with 80% continuous, synergistic step length per gait cycle. Pt performed seated dynamic balance activity seated, unsupported with CGA/SBA to improve functional transfer mobility with 0 UE support with 0 LOB. Seated dynamic balance- F+. Pt seated, unsupported for >15 min.  No new concerns today.  Denies n/v/f/c pain.        Objective  Vital signs: 95/58,86,96%    Physical Exam  Constitutional:       General: She is not in acute distress.  Eyes:      Extraocular Movements: Extraocular movements intact.   Cardiovascular:      Rate and Rhythm: Normal rate and regular rhythm.   Pulmonary:      Effort: Pulmonary effort is normal.      Breath sounds: Normal breath sounds.      Comments: O2  Abdominal:      General: Bowel sounds are normal.      Palpations: Abdomen is soft.   Musculoskeletal:      Cervical back: Neck supple.      Right lower leg: No edema.      Left lower leg: No edema.   Skin:     Comments: Dressing to chest clean dry and intact   Neurological:      Mental Status: She is alert.      Motor: Weakness present.   Psychiatric:         Mood and Affect: Mood normal.         Behavior: Behavior is cooperative.         Assessment/Plan  Problem List Items Addressed This Visit       Hypertension     Monitor BP  Metoprolol  BP at goal         GERD (gastroesophageal reflux disease)     PPI  Monitor for GI  symptoms         CHF (congestive heart failure) (Multi)     Echo revealed EF of 68% February 2024  Lasix  Monitor for shortness of breath, stable         Weakness     Remains in therapy            Medications, treatments, and labs reviewed  Continue medications and treatments as listed in EMR    Scribe Attestation  Snehal GOMEZ Scribe   attest that this documentation has been prepared under the direction and in the presence of Jacqueline Singh MD.     Provider Attestation - Scribe documentation  All medical record entries made by the Scribe were at my direction and personally dictated by me. I have reviewed the chart and agree that the record accurately reflects my personal performance of the history, physical exam, discussion and plan.   Jacqueline Singh MD      Electronically Signed By: Jacqueline Singh MD   5/20/24  5:07 PM

## 2024-05-20 ENCOUNTER — NURSING HOME VISIT (OUTPATIENT)
Dept: POST ACUTE CARE | Facility: EXTERNAL LOCATION | Age: 89
End: 2024-05-20
Payer: MEDICARE

## 2024-05-20 DIAGNOSIS — I15.8 OTHER SECONDARY HYPERTENSION: ICD-10-CM

## 2024-05-20 DIAGNOSIS — R53.1 WEAKNESS: ICD-10-CM

## 2024-05-20 DIAGNOSIS — I50.9 CONGESTIVE HEART FAILURE, UNSPECIFIED HF CHRONICITY, UNSPECIFIED HEART FAILURE TYPE (MULTI): ICD-10-CM

## 2024-05-20 PROCEDURE — 99308 SBSQ NF CARE LOW MDM 20: CPT | Performed by: INTERNAL MEDICINE

## 2024-05-20 NOTE — PROGRESS NOTES
PROGRESS NOTE    Subjective   Chief complaint: Suleman Marquis is a 88 y.o. female who is an acute skilled patient being seen and evaluated for weakness    HPI:  Patient in therapy d/t generalized weakness.  Patient presents for f/u.  Continues to work toward goals in therapy. Focusing on bed mobility activities to increase functional skills, transfer training to increase functional task performance, static balance activities during sitting and facilitation of postural control with min of 1.  No new complaints at this time.      Objective   Vital signs: 95/58,86,97%    Physical Exam  Constitutional:       General: She is not in acute distress.  Eyes:      Extraocular Movements: Extraocular movements intact.   Cardiovascular:      Rate and Rhythm: Normal rate and regular rhythm.   Pulmonary:      Effort: Pulmonary effort is normal.      Breath sounds: Normal breath sounds.      Comments: O2  Abdominal:      General: Bowel sounds are normal.      Palpations: Abdomen is soft.   Musculoskeletal:      Cervical back: Neck supple.      Right lower leg: No edema.      Left lower leg: No edema.   Skin:     Comments: Dressing to chest clean dry and intact   Neurological:      Mental Status: She is alert.      Motor: Weakness present.   Psychiatric:         Mood and Affect: Mood normal.         Behavior: Behavior is cooperative.         Assessment/Plan   Problem List Items Addressed This Visit       Hypertension     Monitor BP  Metoprolol  BP at goal         CHF (congestive heart failure) (Multi)     Echo revealed EF of 68% February 2024  Lasix  Monitor for shortness of breath, stable         Weakness     Remains in therapy            Medications, treatments, and labs reviewed  Continue medications and treatments as listed in EMR    Scribe Attestation  I, Charity Mahmood   attest that this documentation has been prepared under the direction and in the presence of Jacqueline Singh MD.     Provider Attestation - Scribe  documentation  All medical record entries made by the Scribe were at my direction and personally dictated by me. I have reviewed the chart and agree that the record accurately reflects my personal performance of the history, physical exam, discussion and plan.   Jacqueline Singh MD

## 2024-05-20 NOTE — PROGRESS NOTES
PROGRESS NOTE    Subjective   Chief complaint: Suleman Marquis is a 88 y.o. female who is an acute skilled patient being seen and evaluated for weakness    HPI:  Patient has been working in therapy to improve strength, endurance, and ADLs.  Patient continues to work toward goals. Pt ambulated with 2WW with S for distances of 148ft negotiating thresholds, obstacles, and performing multiple B/L turns with 25% increased upper trunk rotation with AD with min. path deviation with no quantifiable tracking. PTA notes mod 3 step through gait pattern with 80% continuous, synergistic step length per gait cycle. Pt performed seated dynamic balance activity seated, unsupported with CGA/SBA to improve functional transfer mobility with 0 UE support with 0 LOB. Seated dynamic balance- F+. Pt seated, unsupported for >15 min.  No new concerns today.  Denies n/v/f/c pain.        Objective   Vital signs: 95/58,86,96%    Physical Exam  Constitutional:       General: She is not in acute distress.  Eyes:      Extraocular Movements: Extraocular movements intact.   Cardiovascular:      Rate and Rhythm: Normal rate and regular rhythm.   Pulmonary:      Effort: Pulmonary effort is normal.      Breath sounds: Normal breath sounds.      Comments: O2  Abdominal:      General: Bowel sounds are normal.      Palpations: Abdomen is soft.   Musculoskeletal:      Cervical back: Neck supple.      Right lower leg: No edema.      Left lower leg: No edema.   Skin:     Comments: Dressing to chest clean dry and intact   Neurological:      Mental Status: She is alert.      Motor: Weakness present.   Psychiatric:         Mood and Affect: Mood normal.         Behavior: Behavior is cooperative.         Assessment/Plan   Problem List Items Addressed This Visit       Hypertension     Monitor BP  Metoprolol  BP at goal         GERD (gastroesophageal reflux disease)     PPI  Monitor for GI symptoms         CHF (congestive heart failure) (Multi)     Echo revealed  EF of 68% February 2024  Lasix  Monitor for shortness of breath, stable         Weakness     Remains in therapy            Medications, treatments, and labs reviewed  Continue medications and treatments as listed in EMR    Scribe Attestation  I, Charity Mahmood   attest that this documentation has been prepared under the direction and in the presence of Jacqueline Singh MD.     Provider Attestation - Scribe documentation  All medical record entries made by the Scribe were at my direction and personally dictated by me. I have reviewed the chart and agree that the record accurately reflects my personal performance of the history, physical exam, discussion and plan.   Jacqueline Singh MD

## 2024-05-21 ENCOUNTER — NURSING HOME VISIT (OUTPATIENT)
Dept: POST ACUTE CARE | Facility: EXTERNAL LOCATION | Age: 89
End: 2024-05-21
Payer: MEDICARE

## 2024-05-21 DIAGNOSIS — K21.9 GASTROESOPHAGEAL REFLUX DISEASE, UNSPECIFIED WHETHER ESOPHAGITIS PRESENT: ICD-10-CM

## 2024-05-21 DIAGNOSIS — I50.9 CONGESTIVE HEART FAILURE, UNSPECIFIED HF CHRONICITY, UNSPECIFIED HEART FAILURE TYPE (MULTI): ICD-10-CM

## 2024-05-21 DIAGNOSIS — E44.0 MODERATE PROTEIN-CALORIE MALNUTRITION (MULTI): ICD-10-CM

## 2024-05-21 DIAGNOSIS — I15.8 OTHER SECONDARY HYPERTENSION: ICD-10-CM

## 2024-05-21 DIAGNOSIS — Z86.711 HISTORY OF PULMONARY EMBOLUS (PE): ICD-10-CM

## 2024-05-21 DIAGNOSIS — R53.1 WEAKNESS: Primary | ICD-10-CM

## 2024-05-21 PROCEDURE — 99308 SBSQ NF CARE LOW MDM 20: CPT | Performed by: NURSE PRACTITIONER

## 2024-05-21 NOTE — PROGRESS NOTES
PROGRESS NOTE    Subjective   Chief complaint: Suleman Marquis is a 88 y.o. female who is an acute skilled patient being seen and evaluated for weakness    HPI:  She is pleasant and talkative.  Requires oxygen. Remains in therapy.   Was just seen by the wound care team to assess her chest wound.    She reports that she is doing well and denies any pain.  Appetite remains poor.  Advised caregiver to encourage fluids and supplements .  HPI      Objective   Vital signs: 109/75-76-18-97.7     Physical Exam  Vitals and nursing note reviewed.   Constitutional:       General: She is not in acute distress.     Appearance: She is well-groomed. She is cachectic.   Eyes:      Extraocular Movements: Extraocular movements intact.   Cardiovascular:      Rate and Rhythm: Normal rate and regular rhythm.   Pulmonary:      Effort: Pulmonary effort is normal.      Breath sounds: Normal breath sounds.      Comments: Lungs clear   Abdominal:      General: Bowel sounds are normal.      Palpations: Abdomen is soft.   Musculoskeletal:      Cervical back: Neck supple.      Right lower leg: No edema.      Left lower leg: No edema.   Neurological:      Mental Status: She is alert.   Psychiatric:         Mood and Affect: Mood normal.         Behavior: Behavior is cooperative.         Assessment/Plan   Problem List Items Addressed This Visit       Hypertension     Monitor BP  Metoprolol  BP at goal         GERD (gastroesophageal reflux disease)     PPI  Monitor for GI symptoms         CHF (congestive heart failure) (Multi)     Echo revealed EF of 68% February 2024  Lasix  Monitor for shortness of breath, stable         Weakness - Primary     Continue working towards goals in therapy         History of pulmonary embolus (PE)     Remains on coumadin   INR monitored   OXYGEN THERAPY PRN          Malnutrition (Multi)     Malnutrition HX advanced cancer  Monitor intake   Dietitian to evaluate  Taking Supplements   Labs             Medications,  treatments, and labs reviewed  Continue medications and treatments as listed in EMR    Nikki Pressley, APRN-CNP

## 2024-05-21 NOTE — LETTER
Patient: Suleman Marquis  : 1935    Encounter Date: 2024    PROGRESS NOTE    Subjective  Chief complaint: Suleman Marquis is a 88 y.o. female who is an acute skilled patient being seen and evaluated for weakness    HPI:  She is pleasant and talkative.  Requires oxygen. Remains in therapy.   Was just seen by the wound care team to assess her chest wound.    She reports that she is doing well and denies any pain.  Appetite remains poor.  Advised caregiver to encourage fluids and supplements .  HPI      Objective  Vital signs: 109/75-76-18-97.7     Physical Exam  Vitals and nursing note reviewed.   Constitutional:       General: She is not in acute distress.     Appearance: She is well-groomed. She is cachectic.   Eyes:      Extraocular Movements: Extraocular movements intact.   Cardiovascular:      Rate and Rhythm: Normal rate and regular rhythm.   Pulmonary:      Effort: Pulmonary effort is normal.      Breath sounds: Normal breath sounds.      Comments: Lungs clear   Abdominal:      General: Bowel sounds are normal.      Palpations: Abdomen is soft.   Musculoskeletal:      Cervical back: Neck supple.      Right lower leg: No edema.      Left lower leg: No edema.   Neurological:      Mental Status: She is alert.   Psychiatric:         Mood and Affect: Mood normal.         Behavior: Behavior is cooperative.         Assessment/Plan  Problem List Items Addressed This Visit       Hypertension     Monitor BP  Metoprolol  BP at goal         GERD (gastroesophageal reflux disease)     PPI  Monitor for GI symptoms         CHF (congestive heart failure) (Multi)     Echo revealed EF of 68% 2024  Lasix  Monitor for shortness of breath, stable         Weakness - Primary     Continue working towards goals in therapy         History of pulmonary embolus (PE)     Remains on coumadin   INR monitored   OXYGEN THERAPY PRN          Malnutrition (Multi)     Malnutrition HX advanced cancer  Monitor intake   Dietitian  to evaluate  Taking Supplements   Labs             Medications, treatments, and labs reviewed  Continue medications and treatments as listed in EMR    DAVID Pressley      Electronically Signed By: DAVID Pressley   5/25/24 12:02 PM

## 2024-05-22 ENCOUNTER — NURSING HOME VISIT (OUTPATIENT)
Dept: POST ACUTE CARE | Facility: EXTERNAL LOCATION | Age: 89
End: 2024-05-22
Payer: MEDICARE

## 2024-05-22 DIAGNOSIS — R53.1 WEAKNESS: Primary | ICD-10-CM

## 2024-05-22 DIAGNOSIS — I50.9 CONGESTIVE HEART FAILURE, UNSPECIFIED HF CHRONICITY, UNSPECIFIED HEART FAILURE TYPE (MULTI): ICD-10-CM

## 2024-05-22 DIAGNOSIS — I15.8 OTHER SECONDARY HYPERTENSION: ICD-10-CM

## 2024-05-22 PROCEDURE — 99308 SBSQ NF CARE LOW MDM 20: CPT | Performed by: INTERNAL MEDICINE

## 2024-05-22 NOTE — LETTER
Patient: Suleman Marquis  : 1935    Encounter Date: 2024    PROGRESS NOTE    Subjective  Chief complaint: Suleman Marquis is a 88 y.o. female who is an acute skilled patient being seen and evaluated for weakness    HPI:  HPI  Patient does continue to work in therapy due to generalized weakness.  Patient is performing sit to stand transfers x 3 from wheelchair to edge of bed due to wheeled walker with CGA to min assist.  Patient is also working on standing dynamic balance activities requiring forward backward steps.  Patient is reported to have standing dynamic balance fair plus.  Patient seen and examined at the bedside, appears to be in no acute distress.  Nursing staff voicing no new concerns.  Patient denies constitutional symptoms.    Objective  Vital signs: 101/50, 98.1, 72, 18, 96%    Physical Exam  Constitutional:       General: She is not in acute distress.  Eyes:      Extraocular Movements: Extraocular movements intact.   Cardiovascular:      Rate and Rhythm: Normal rate and regular rhythm.   Pulmonary:      Effort: Pulmonary effort is normal.      Breath sounds: Normal breath sounds.      Comments: O2  Abdominal:      General: Bowel sounds are normal.      Palpations: Abdomen is soft.   Musculoskeletal:      Cervical back: Neck supple.      Right lower leg: No edema.      Left lower leg: No edema.   Skin:     Comments: Dressing to chest clean dry and intact   Neurological:      Mental Status: She is alert.      Motor: Weakness present.   Psychiatric:         Mood and Affect: Mood normal.         Behavior: Behavior is cooperative.         Assessment/Plan  Problem List Items Addressed This Visit       Hypertension     Monitor BP  Metoprolol  BP at goal         CHF (congestive heart failure) (Multi)     Echo revealed EF of 68% 2024  Lasix  Monitor for shortness of breath, stable         Weakness - Primary     Continue working towards goals in therapy          Medications, treatments, and  labs reviewed  Continue medications and treatments as listed in EMR      Scribe Attestation  I, Charity Baker   attest that this documentation has been prepared under the direction and in the presence of Jacqueline Singh MD    Provider Attestation - Scribe documentation  All medical record entries made by the Scribe were at my direction and personally dictated by me. I have reviewed the chart and agree that the record accurately reflects my personal performance of the history, physical exam, discussion and plan.   Jacqueline Singh MD        Electronically Signed By: Jacqueline Singh MD   5/22/24  3:23 PM

## 2024-05-22 NOTE — PROGRESS NOTES
PROGRESS NOTE    Subjective   Chief complaint: Suleman Marquis is a 88 y.o. female who is an acute skilled patient being seen and evaluated for weakness    HPI:  HPI  Patient does continue to work in therapy due to generalized weakness.  Patient is performing sit to stand transfers x 3 from wheelchair to edge of bed due to wheeled walker with CGA to min assist.  Patient is also working on standing dynamic balance activities requiring forward backward steps.  Patient is reported to have standing dynamic balance fair plus.  Patient seen and examined at the bedside, appears to be in no acute distress.  Nursing staff voicing no new concerns.  Patient denies constitutional symptoms.    Objective   Vital signs: 101/50, 98.1, 72, 18, 96%    Physical Exam  Constitutional:       General: She is not in acute distress.  Eyes:      Extraocular Movements: Extraocular movements intact.   Cardiovascular:      Rate and Rhythm: Normal rate and regular rhythm.   Pulmonary:      Effort: Pulmonary effort is normal.      Breath sounds: Normal breath sounds.      Comments: O2  Abdominal:      General: Bowel sounds are normal.      Palpations: Abdomen is soft.   Musculoskeletal:      Cervical back: Neck supple.      Right lower leg: No edema.      Left lower leg: No edema.   Skin:     Comments: Dressing to chest clean dry and intact   Neurological:      Mental Status: She is alert.      Motor: Weakness present.   Psychiatric:         Mood and Affect: Mood normal.         Behavior: Behavior is cooperative.         Assessment/Plan   Problem List Items Addressed This Visit       Hypertension     Monitor BP  Metoprolol  BP at goal         CHF (congestive heart failure) (Multi)     Echo revealed EF of 68% February 2024  Lasix  Monitor for shortness of breath, stable         Weakness - Primary     Continue working towards goals in therapy          Medications, treatments, and labs reviewed  Continue medications and treatments as listed in  EMR      Scribe Attestation  I, Charity Baker   attest that this documentation has been prepared under the direction and in the presence of Jacqueline Singh MD    Provider Attestation - Scribe documentation  All medical record entries made by the Scribe were at my direction and personally dictated by me. I have reviewed the chart and agree that the record accurately reflects my personal performance of the history, physical exam, discussion and plan.   Jacqueline Singh MD

## 2024-05-23 ENCOUNTER — NURSING HOME VISIT (OUTPATIENT)
Dept: POST ACUTE CARE | Facility: EXTERNAL LOCATION | Age: 89
End: 2024-05-23
Payer: MEDICARE

## 2024-05-23 DIAGNOSIS — I15.8 OTHER SECONDARY HYPERTENSION: ICD-10-CM

## 2024-05-23 DIAGNOSIS — I50.9 CONGESTIVE HEART FAILURE, UNSPECIFIED HF CHRONICITY, UNSPECIFIED HEART FAILURE TYPE (MULTI): ICD-10-CM

## 2024-05-23 DIAGNOSIS — E44.0 MODERATE PROTEIN-CALORIE MALNUTRITION (MULTI): ICD-10-CM

## 2024-05-23 DIAGNOSIS — Z86.711 HISTORY OF PULMONARY EMBOLUS (PE): ICD-10-CM

## 2024-05-23 DIAGNOSIS — R53.1 WEAKNESS: Primary | ICD-10-CM

## 2024-05-23 DIAGNOSIS — K21.9 GASTROESOPHAGEAL REFLUX DISEASE, UNSPECIFIED WHETHER ESOPHAGITIS PRESENT: ICD-10-CM

## 2024-05-23 DIAGNOSIS — R23.9 ALTERATION IN SKIN INTEGRITY: ICD-10-CM

## 2024-05-23 PROCEDURE — 99309 SBSQ NF CARE MODERATE MDM 30: CPT | Performed by: NURSE PRACTITIONER

## 2024-05-24 NOTE — PROGRESS NOTES
PROGRESS NOTE    Subjective   Chief complaint: Suleman Marquis is a 88 y.o. female who is an acute skilled patient being seen and evaluated for weakness    HPI: Remains in therapy.   Is in bed wearing oxygen.  Actively responding.  She reports that she is comfortable.  Denies any chest pain or tightness.  Nursing reports her appetite is consistent.   reports there is scheduled plans for her to discharge home with her son.   HPI      Objective   Vital signs: 112/65-72-18-97.3     Physical Exam  Vitals and nursing note reviewed.   Constitutional:       General: She is not in acute distress.     Appearance: She is well-groomed. She is cachectic.   Eyes:      Extraocular Movements: Extraocular movements intact.   Cardiovascular:      Rate and Rhythm: Normal rate and regular rhythm.   Pulmonary:      Effort: Pulmonary effort is normal.      Breath sounds: Normal breath sounds.      Comments: Lungs clear   Abdominal:      General: Bowel sounds are normal.      Palpations: Abdomen is soft.   Musculoskeletal:      Cervical back: Neck supple.      Right lower leg: No edema.      Left lower leg: No edema.   Neurological:      Mental Status: She is alert.   Psychiatric:         Mood and Affect: Mood normal.         Behavior: Behavior is cooperative.         Assessment/Plan   Problem List Items Addressed This Visit       Hypertension     Monitor BP  Metoprolol  BP at goal         GERD (gastroesophageal reflux disease)     PPI  Monitor for GI symptoms         CHF (congestive heart failure) (Multi)     Echo revealed EF of 68% February 2024  Lasix  Monitor for shortness of breath, stable         Weakness - Primary     Continue working towards goals in therapy  Monitor endurance          History of pulmonary embolus (PE)     Remains on coumadin   INR monitored   OXYGEN THERAPY PRN          Alteration in skin integrity     Chest wound   Wound care provider to follow in the facility            Malnutrition (Multi)      Malnutrition HX advanced cancer  Monitor intake   Dietitian to evaluate  Taking Supplements   Labs             Medications, treatments, and labs reviewed  Continue medications and treatments as listed in EMR    Nikki Pressley, APRN-CNP
